# Patient Record
Sex: MALE | Race: WHITE | Employment: FULL TIME
[De-identification: names, ages, dates, MRNs, and addresses within clinical notes are randomized per-mention and may not be internally consistent; named-entity substitution may affect disease eponyms.]

---

## 2022-03-18 PROBLEM — E66.01 OBESITY, MORBID (HCC): Status: ACTIVE | Noted: 2018-01-09

## 2022-03-19 PROBLEM — K21.9 GASTROESOPHAGEAL REFLUX DISEASE: Status: ACTIVE | Noted: 2018-07-12

## 2022-08-08 ENCOUNTER — PATIENT MESSAGE (OUTPATIENT)
Dept: INTERNAL MEDICINE CLINIC | Facility: CLINIC | Age: 62
End: 2022-08-08

## 2022-08-09 NOTE — TELEPHONE ENCOUNTER
From: Segun Glass. To: Dr. Alejandro Andres: 8/8/2022 2:01 PM EDT  Subject: Tingling in Right Arm    The past couple of weeks, I have been experiencing periodic tingling sensations in my right arm. It seems to only last a few seconds, then goes away. It does this about 10-12 times per day. ..sometimes more. And the past several nights, I have started experiencing pain shooting from my upper bicep down to about my elbow in my right arm (about 6 on a scale of 1-10). However, the pain is strong enough to wake me. It sometimes lasts for up to an hour before it seems to go away. I don't know if I have a pinched nerve or something else. I just wonder if this is something I should be concerned about.

## 2023-01-26 DIAGNOSIS — I10 ESSENTIAL (PRIMARY) HYPERTENSION: ICD-10-CM

## 2023-01-26 RX ORDER — NEBIVOLOL 10 MG/1
TABLET ORAL
Qty: 90 TABLET | Refills: 0 | Status: SHIPPED | OUTPATIENT
Start: 2023-01-26

## 2023-02-02 DIAGNOSIS — I10 ESSENTIAL (PRIMARY) HYPERTENSION: ICD-10-CM

## 2023-02-02 RX ORDER — AMLODIPINE BESYLATE 10 MG/1
TABLET ORAL
Qty: 90 TABLET | Refills: 0 | Status: SHIPPED | OUTPATIENT
Start: 2023-02-02 | End: 2023-02-24 | Stop reason: SDUPTHER

## 2023-02-22 SDOH — ECONOMIC STABILITY: INCOME INSECURITY: HOW HARD IS IT FOR YOU TO PAY FOR THE VERY BASICS LIKE FOOD, HOUSING, MEDICAL CARE, AND HEATING?: NOT HARD AT ALL

## 2023-02-22 SDOH — ECONOMIC STABILITY: TRANSPORTATION INSECURITY
IN THE PAST 12 MONTHS, HAS LACK OF TRANSPORTATION KEPT YOU FROM MEETINGS, WORK, OR FROM GETTING THINGS NEEDED FOR DAILY LIVING?: NO

## 2023-02-22 SDOH — ECONOMIC STABILITY: FOOD INSECURITY: WITHIN THE PAST 12 MONTHS, YOU WORRIED THAT YOUR FOOD WOULD RUN OUT BEFORE YOU GOT MONEY TO BUY MORE.: NEVER TRUE

## 2023-02-22 SDOH — ECONOMIC STABILITY: HOUSING INSECURITY
IN THE LAST 12 MONTHS, WAS THERE A TIME WHEN YOU DID NOT HAVE A STEADY PLACE TO SLEEP OR SLEPT IN A SHELTER (INCLUDING NOW)?: NO

## 2023-02-22 SDOH — ECONOMIC STABILITY: FOOD INSECURITY: WITHIN THE PAST 12 MONTHS, THE FOOD YOU BOUGHT JUST DIDN'T LAST AND YOU DIDN'T HAVE MONEY TO GET MORE.: NEVER TRUE

## 2023-02-24 ENCOUNTER — OFFICE VISIT (OUTPATIENT)
Dept: INTERNAL MEDICINE CLINIC | Facility: CLINIC | Age: 63
End: 2023-02-24
Payer: COMMERCIAL

## 2023-02-24 VITALS
HEART RATE: 50 BPM | DIASTOLIC BLOOD PRESSURE: 70 MMHG | RESPIRATION RATE: 17 BRPM | WEIGHT: 253 LBS | BODY MASS INDEX: 39.71 KG/M2 | OXYGEN SATURATION: 98 % | SYSTOLIC BLOOD PRESSURE: 130 MMHG | HEIGHT: 67 IN

## 2023-02-24 DIAGNOSIS — Z12.5 SPECIAL SCREENING FOR MALIGNANT NEOPLASM OF PROSTATE: ICD-10-CM

## 2023-02-24 DIAGNOSIS — I10 ESSENTIAL (PRIMARY) HYPERTENSION: Primary | ICD-10-CM

## 2023-02-24 DIAGNOSIS — E78.2 MIXED HYPERLIPIDEMIA: ICD-10-CM

## 2023-02-24 DIAGNOSIS — K21.9 GASTROESOPHAGEAL REFLUX DISEASE, UNSPECIFIED WHETHER ESOPHAGITIS PRESENT: ICD-10-CM

## 2023-02-24 LAB
ALBUMIN SERPL-MCNC: 3.7 G/DL (ref 3.2–4.6)
ALBUMIN/GLOB SERPL: 1 (ref 0.4–1.6)
ALP SERPL-CCNC: 72 U/L (ref 50–136)
ALT SERPL-CCNC: 50 U/L (ref 12–65)
ANION GAP SERPL CALC-SCNC: 3 MMOL/L (ref 2–11)
AST SERPL-CCNC: 18 U/L (ref 15–37)
BILIRUB DIRECT SERPL-MCNC: 0.2 MG/DL
BILIRUB SERPL-MCNC: 0.5 MG/DL (ref 0.2–1.1)
BUN SERPL-MCNC: 16 MG/DL (ref 8–23)
CALCIUM SERPL-MCNC: 9.7 MG/DL (ref 8.3–10.4)
CHLORIDE SERPL-SCNC: 106 MMOL/L (ref 101–110)
CHOLEST SERPL-MCNC: 172 MG/DL
CO2 SERPL-SCNC: 28 MMOL/L (ref 21–32)
CREAT SERPL-MCNC: 1 MG/DL (ref 0.8–1.5)
ERYTHROCYTE [DISTWIDTH] IN BLOOD BY AUTOMATED COUNT: 12.5 % (ref 11.9–14.6)
GLOBULIN SER CALC-MCNC: 3.7 G/DL (ref 2.8–4.5)
GLUCOSE SERPL-MCNC: 111 MG/DL (ref 65–100)
HCT VFR BLD AUTO: 47.3 % (ref 41.1–50.3)
HDLC SERPL-MCNC: 52 MG/DL (ref 40–60)
HDLC SERPL: 3.3
HGB BLD-MCNC: 15.7 G/DL (ref 13.6–17.2)
LDLC SERPL CALC-MCNC: 93.4 MG/DL
MCH RBC QN AUTO: 30.4 PG (ref 26.1–32.9)
MCHC RBC AUTO-ENTMCNC: 33.2 G/DL (ref 31.4–35)
MCV RBC AUTO: 91.7 FL (ref 82–102)
NRBC # BLD: 0 K/UL (ref 0–0.2)
PLATELET # BLD AUTO: 204 K/UL (ref 150–450)
PMV BLD AUTO: 11.4 FL (ref 9.4–12.3)
POTASSIUM SERPL-SCNC: 4.6 MMOL/L (ref 3.5–5.1)
PROT SERPL-MCNC: 7.4 G/DL (ref 6.3–8.2)
PSA SERPL-MCNC: 0.6 NG/ML
RBC # BLD AUTO: 5.16 M/UL (ref 4.23–5.6)
SODIUM SERPL-SCNC: 137 MMOL/L (ref 133–143)
TRIGL SERPL-MCNC: 133 MG/DL (ref 35–150)
VLDLC SERPL CALC-MCNC: 26.6 MG/DL (ref 6–23)
WBC # BLD AUTO: 6.1 K/UL (ref 4.3–11.1)

## 2023-02-24 PROCEDURE — 3078F DIAST BP <80 MM HG: CPT | Performed by: FAMILY MEDICINE

## 2023-02-24 PROCEDURE — 3075F SYST BP GE 130 - 139MM HG: CPT | Performed by: FAMILY MEDICINE

## 2023-02-24 PROCEDURE — 99214 OFFICE O/P EST MOD 30 MIN: CPT | Performed by: FAMILY MEDICINE

## 2023-02-24 RX ORDER — NEBIVOLOL 10 MG/1
TABLET ORAL
Qty: 90 TABLET | Refills: 1 | Status: SHIPPED | OUTPATIENT
Start: 2023-02-24

## 2023-02-24 RX ORDER — ATORVASTATIN CALCIUM 40 MG/1
TABLET, FILM COATED ORAL
Qty: 90 TABLET | Refills: 1 | Status: SHIPPED | OUTPATIENT
Start: 2023-02-24

## 2023-02-24 RX ORDER — OMEPRAZOLE 40 MG/1
CAPSULE, DELAYED RELEASE ORAL
Qty: 90 CAPSULE | Refills: 1 | Status: SHIPPED | OUTPATIENT
Start: 2023-02-24

## 2023-02-24 RX ORDER — LISINOPRIL 40 MG/1
TABLET ORAL
Qty: 90 TABLET | Refills: 1 | Status: SHIPPED | OUTPATIENT
Start: 2023-02-24

## 2023-02-24 RX ORDER — AMLODIPINE BESYLATE 10 MG/1
TABLET ORAL
Qty: 90 TABLET | Refills: 1 | Status: SHIPPED | OUTPATIENT
Start: 2023-02-24

## 2023-02-24 ASSESSMENT — PATIENT HEALTH QUESTIONNAIRE - PHQ9
1. LITTLE INTEREST OR PLEASURE IN DOING THINGS: 0
SUM OF ALL RESPONSES TO PHQ QUESTIONS 1-9: 0
2. FEELING DOWN, DEPRESSED OR HOPELESS: 0
SUM OF ALL RESPONSES TO PHQ QUESTIONS 1-9: 0
SUM OF ALL RESPONSES TO PHQ9 QUESTIONS 1 & 2: 0

## 2023-02-24 NOTE — PROGRESS NOTES
Mirian Suarez DO  Diplomate of the [email or web address] of [address] [place] [address] Internal Medicine      Will Ellis (: 1960) is a 66 y.o. male, here for evaluation of the following chief complaint(s):  Hypertension       ASSESSMENT/PLAN:  1. Essential (primary) hypertension  -     nebivolol (BYSTOLIC) 10 MG tablet; TAKE 1 TABLET BY MOUTH EVERY DAY, Disp-90 tablet, R-1Normal  -     amLODIPine (NORVASC) 10 MG tablet; TAKE 1 TABLET BY MOUTH EVERY DAY, Disp-90 tablet, R-1Normal  -     lisinopril (PRINIVIL;ZESTRIL) 40 MG tablet; TAKE 1 TABLET BY MOUTH EVERY DAY, Disp-90 tablet, R-1Normal  -     Basic Metabolic Panel; Future  -     CBC; Future  2. Gastroesophageal reflux disease, unspecified whether esophagitis present  -     omeprazole (PRILOSEC) 40 MG delayed release capsule; TAKE 1 CAPSULE BY MOUTH EVERY DAY, Disp-90 capsule, R-1Normal  3. Mixed hyperlipidemia  -     atorvastatin (LIPITOR) 40 MG tablet; TAKE 1 TABLET BY MOUTH EVERY DAY, Disp-90 tablet, R-1Normal  -     Hepatic Function Panel; Future  -     Lipid Panel; Future  4. Special screening for malignant neoplasm of prostate  -     PSA Screening; Future     Tolerating medication well for HTN. Achieving desired therapeutic response with   Key Anti-Hypertensive Meds            nebivolol (BYSTOLIC) 10 MG tablet (Taking)    Sig: TAKE 1 TABLET BY MOUTH EVERY DAY    amLODIPine (NORVASC) 10 MG tablet (Taking)    Sig: TAKE 1 TABLET BY MOUTH EVERY DAY    lisinopril (PRINIVIL;ZESTRIL) 40 MG tablet (Taking)    Sig: TAKE 1 TABLET BY MOUTH EVERY DAY         --will continue. Will periodically review and adjust if needed. Encourage home monitoring. We will continue with omeprazole. Again he did try to wean off but was unsuccessful. He is getting good clinical benefit from this and tolerating it well. Recheck lipid levels. Continue with atorvastatin as providing good clinical benefit.   Pros and cons of PSA testing for prostate screening were discussed. The patient does wish to proceed. SUBJECTIVE/OBJECTIVE:  HPI:  HTN: Home BP Monitoring: yes. taking medications as instructed, no medication side effects noted. He denies chest pain, palpitations, exertional pain or pressure. GERD has been doing well. He really has not had any significant breakthrough symptoms. Tolerating medication well. He did try to wean off with Pepcid, but was unsuccessful with return of symptoms. ROS negative except as noted above today. Social History     Tobacco Use    Smoking status: Former     Types: Cigarettes     Quit date: 1998     Years since quittin.2    Smokeless tobacco: Never   Substance Use Topics    Alcohol use: Yes    Drug use: No     Vitals:    23 0803   BP: 130/70   Site: Left Upper Arm   Position: Sitting   Pulse: 50   Resp: 17   SpO2: 98%   Weight: 253 lb (114.8 kg)   Height: 5' 7\" (1.702 m)      Body mass index is 39.63 kg/m². Physical Exam  Vitals reviewed. Constitutional:       General: He is not in acute distress. Appearance: He is not ill-appearing. HENT:      Head: Normocephalic. Eyes:      Extraocular Movements: Extraocular movements intact. Cardiovascular:      Rate and Rhythm: Normal rate and regular rhythm. Heart sounds: Normal heart sounds. Pulmonary:      Effort: Pulmonary effort is normal.      Breath sounds: Normal breath sounds. Abdominal:      General: Abdomen is flat. Palpations: Abdomen is soft. Tenderness: There is no abdominal tenderness. Musculoskeletal:         General: Normal range of motion. Cervical back: Neck supple. Skin:     General: Skin is warm and dry. Neurological:      General: No focal deficit present. Mental Status: He is alert. An electronic signature was used to authenticate this note.   Alexia Cotto DO

## 2023-07-29 DIAGNOSIS — E78.2 MIXED HYPERLIPIDEMIA: ICD-10-CM

## 2023-07-31 RX ORDER — ATORVASTATIN CALCIUM 40 MG/1
TABLET, FILM COATED ORAL
Qty: 90 TABLET | Refills: 1 | Status: SHIPPED | OUTPATIENT
Start: 2023-07-31

## 2023-09-06 DIAGNOSIS — I10 ESSENTIAL (PRIMARY) HYPERTENSION: ICD-10-CM

## 2023-09-07 RX ORDER — LISINOPRIL 40 MG/1
TABLET ORAL
Qty: 90 TABLET | Refills: 1 | Status: SHIPPED | OUTPATIENT
Start: 2023-09-07

## 2023-09-17 DIAGNOSIS — E78.2 MIXED HYPERLIPIDEMIA: ICD-10-CM

## 2023-09-18 RX ORDER — ATORVASTATIN CALCIUM 40 MG/1
40 TABLET, FILM COATED ORAL DAILY
Qty: 90 TABLET | Refills: 1 | Status: SHIPPED | OUTPATIENT
Start: 2023-09-18

## 2023-10-01 DIAGNOSIS — I10 ESSENTIAL (PRIMARY) HYPERTENSION: ICD-10-CM

## 2023-10-02 RX ORDER — AMLODIPINE BESYLATE 10 MG/1
TABLET ORAL
Qty: 90 TABLET | Refills: 1 | Status: SHIPPED | OUTPATIENT
Start: 2023-10-02

## 2023-10-11 DIAGNOSIS — I10 ESSENTIAL (PRIMARY) HYPERTENSION: ICD-10-CM

## 2023-10-11 RX ORDER — NEBIVOLOL 10 MG/1
TABLET ORAL
Qty: 90 TABLET | Refills: 1 | Status: SHIPPED | OUTPATIENT
Start: 2023-10-11

## 2023-12-03 DIAGNOSIS — K21.9 GASTROESOPHAGEAL REFLUX DISEASE, UNSPECIFIED WHETHER ESOPHAGITIS PRESENT: ICD-10-CM

## 2023-12-04 RX ORDER — OMEPRAZOLE 40 MG/1
CAPSULE, DELAYED RELEASE ORAL
Qty: 90 CAPSULE | Refills: 1 | Status: SHIPPED | OUTPATIENT
Start: 2023-12-04

## 2024-02-20 RX ORDER — NEBIVOLOL 10 MG/1
10 TABLET ORAL DAILY
Qty: 90 TABLET | Refills: 1 | Status: CANCELLED | OUTPATIENT
Start: 2024-02-20

## 2024-02-21 DIAGNOSIS — I10 ESSENTIAL (PRIMARY) HYPERTENSION: ICD-10-CM

## 2024-02-22 DIAGNOSIS — I10 ESSENTIAL (PRIMARY) HYPERTENSION: ICD-10-CM

## 2024-02-23 RX ORDER — LISINOPRIL 40 MG/1
TABLET ORAL
Qty: 90 TABLET | Refills: 1 | OUTPATIENT
Start: 2024-02-23

## 2024-02-23 RX ORDER — LISINOPRIL 40 MG/1
40 TABLET ORAL DAILY
Qty: 90 TABLET | Refills: 1 | Status: SHIPPED | OUTPATIENT
Start: 2024-02-23

## 2024-02-23 ASSESSMENT — PATIENT HEALTH QUESTIONNAIRE - PHQ9
SUM OF ALL RESPONSES TO PHQ9 QUESTIONS 1 & 2: 0
SUM OF ALL RESPONSES TO PHQ QUESTIONS 1-9: 0
2. FEELING DOWN, DEPRESSED OR HOPELESS: 0
SUM OF ALL RESPONSES TO PHQ9 QUESTIONS 1 & 2: 0
SUM OF ALL RESPONSES TO PHQ QUESTIONS 1-9: 0
2. FEELING DOWN, DEPRESSED OR HOPELESS: NOT AT ALL
1. LITTLE INTEREST OR PLEASURE IN DOING THINGS: NOT AT ALL

## 2024-02-26 ENCOUNTER — OFFICE VISIT (OUTPATIENT)
Dept: INTERNAL MEDICINE CLINIC | Facility: CLINIC | Age: 64
End: 2024-02-26
Payer: COMMERCIAL

## 2024-02-26 VITALS
HEIGHT: 67 IN | BODY MASS INDEX: 41.59 KG/M2 | OXYGEN SATURATION: 96 % | HEART RATE: 70 BPM | WEIGHT: 265 LBS | SYSTOLIC BLOOD PRESSURE: 128 MMHG | DIASTOLIC BLOOD PRESSURE: 68 MMHG

## 2024-02-26 DIAGNOSIS — I48.91 ATRIAL FIBRILLATION WITH RAPID VENTRICULAR RESPONSE (HCC): Primary | ICD-10-CM

## 2024-02-26 DIAGNOSIS — E66.01 OBESITY, CLASS III, BMI 40-49.9 (MORBID OBESITY) (HCC): ICD-10-CM

## 2024-02-26 DIAGNOSIS — I10 ESSENTIAL (PRIMARY) HYPERTENSION: ICD-10-CM

## 2024-02-26 DIAGNOSIS — I49.9 IRREGULAR HEART BEAT: ICD-10-CM

## 2024-02-26 DIAGNOSIS — K21.9 GASTROESOPHAGEAL REFLUX DISEASE, UNSPECIFIED WHETHER ESOPHAGITIS PRESENT: ICD-10-CM

## 2024-02-26 DIAGNOSIS — E78.2 MIXED HYPERLIPIDEMIA: ICD-10-CM

## 2024-02-26 DIAGNOSIS — Z12.5 SPECIAL SCREENING FOR MALIGNANT NEOPLASM OF PROSTATE: ICD-10-CM

## 2024-02-26 LAB
ALBUMIN SERPL-MCNC: 3.9 G/DL (ref 3.2–4.6)
ALBUMIN/GLOB SERPL: 1.1 (ref 0.4–1.6)
ALP SERPL-CCNC: 77 U/L (ref 50–136)
ALT SERPL-CCNC: 42 U/L (ref 12–65)
ANION GAP SERPL CALC-SCNC: 7 MMOL/L (ref 2–11)
AST SERPL-CCNC: 22 U/L (ref 15–37)
BILIRUB DIRECT SERPL-MCNC: 0.2 MG/DL
BILIRUB SERPL-MCNC: 0.6 MG/DL (ref 0.2–1.1)
BUN SERPL-MCNC: 19 MG/DL (ref 8–23)
CALCIUM SERPL-MCNC: 9.7 MG/DL (ref 8.3–10.4)
CHLORIDE SERPL-SCNC: 110 MMOL/L (ref 103–113)
CHOLEST SERPL-MCNC: 178 MG/DL
CO2 SERPL-SCNC: 26 MMOL/L (ref 21–32)
CREAT SERPL-MCNC: 1.2 MG/DL (ref 0.8–1.5)
ERYTHROCYTE [DISTWIDTH] IN BLOOD BY AUTOMATED COUNT: 12.8 % (ref 11.9–14.6)
GLOBULIN SER CALC-MCNC: 3.6 G/DL (ref 2.8–4.5)
GLUCOSE SERPL-MCNC: 138 MG/DL (ref 65–100)
HCT VFR BLD AUTO: 50.9 % (ref 41.1–50.3)
HDLC SERPL-MCNC: 48 MG/DL (ref 40–60)
HDLC SERPL: 3.7
HGB BLD-MCNC: 16.8 G/DL (ref 13.6–17.2)
LDLC SERPL CALC-MCNC: 105.4 MG/DL
MCH RBC QN AUTO: 29.9 PG (ref 26.1–32.9)
MCHC RBC AUTO-ENTMCNC: 33 G/DL (ref 31.4–35)
MCV RBC AUTO: 90.7 FL (ref 82–102)
NRBC # BLD: 0 K/UL (ref 0–0.2)
PLATELET # BLD AUTO: 227 K/UL (ref 150–450)
PMV BLD AUTO: 12 FL (ref 9.4–12.3)
POTASSIUM SERPL-SCNC: 4.5 MMOL/L (ref 3.5–5.1)
PROT SERPL-MCNC: 7.5 G/DL (ref 6.3–8.2)
PSA SERPL-MCNC: 0.5 NG/ML
RBC # BLD AUTO: 5.61 M/UL (ref 4.23–5.6)
SODIUM SERPL-SCNC: 143 MMOL/L (ref 136–146)
TRIGL SERPL-MCNC: 123 MG/DL (ref 35–150)
TSH W FREE THYROID IF ABNORMAL: 1.34 UIU/ML (ref 0.36–3.74)
VLDLC SERPL CALC-MCNC: 24.6 MG/DL (ref 6–23)
WBC # BLD AUTO: 8.7 K/UL (ref 4.3–11.1)

## 2024-02-26 PROCEDURE — 99214 OFFICE O/P EST MOD 30 MIN: CPT | Performed by: FAMILY MEDICINE

## 2024-02-26 PROCEDURE — 3074F SYST BP LT 130 MM HG: CPT | Performed by: FAMILY MEDICINE

## 2024-02-26 PROCEDURE — 93000 ELECTROCARDIOGRAM COMPLETE: CPT | Performed by: FAMILY MEDICINE

## 2024-02-26 PROCEDURE — 3078F DIAST BP <80 MM HG: CPT | Performed by: FAMILY MEDICINE

## 2024-02-26 RX ORDER — ATORVASTATIN CALCIUM 40 MG/1
40 TABLET, FILM COATED ORAL DAILY
Qty: 90 TABLET | Refills: 1 | Status: SHIPPED | OUTPATIENT
Start: 2024-02-26

## 2024-02-26 RX ORDER — AMLODIPINE BESYLATE 10 MG/1
TABLET ORAL
Qty: 90 TABLET | Refills: 1 | Status: SHIPPED | OUTPATIENT
Start: 2024-02-26

## 2024-02-26 RX ORDER — LISINOPRIL 40 MG/1
40 TABLET ORAL DAILY
Qty: 90 TABLET | Refills: 1 | Status: SHIPPED | OUTPATIENT
Start: 2024-02-26 | End: 2024-02-29

## 2024-02-26 RX ORDER — METOPROLOL SUCCINATE 50 MG/1
50 TABLET, EXTENDED RELEASE ORAL DAILY
Qty: 30 TABLET | Refills: 5 | Status: SHIPPED | OUTPATIENT
Start: 2024-02-26

## 2024-02-26 NOTE — PROGRESS NOTES
Rebel Joseph DO  Diplomate of the American Board of Family Medicine  [facility]      Jose Sandoval Jr. (: 1960) is a 64 y.o. male, here for evaluation of the following chief complaint(s):  Hypertension and Medication Refill       ASSESSMENT/PLAN:  1. Atrial fibrillation with rapid ventricular response (HCC)  -     apixaban (ELIQUIS) 5 MG TABS tablet; Take 1 tablet by mouth 2 times daily, Disp-60 tablet, R-0Normal  -     metoprolol succinate (TOPROL XL) 50 MG extended release tablet; Take 1 tablet by mouth daily, Disp-30 tablet, R-5Normal  -     Basic Metabolic Panel; Future  -     CBC; Future  -     TSH with Reflex; Future  -     [facility] [place]  2. Essential (primary) hypertension  -     amLODIPine (NORVASC) 10 MG tablet; TAKE 1 TABLET BY MOUTH EVERY DAY, Disp-90 tablet, R-1Normal  -     lisinopril (PRINIVIL;ZESTRIL) 40 MG tablet; Take 1 tablet by mouth daily, Disp-90 tablet, R-1Normal  3. Mixed hyperlipidemia  -     atorvastatin (LIPITOR) 40 MG tablet; Take 1 tablet by mouth daily, Disp-90 tablet, R-1Normal  -     Hepatic Function Panel; Future  -     Lipid Panel; Future  4. Gastroesophageal reflux disease, unspecified whether esophagitis present  5. Obesity, Class III, BMI 40-49.9 (morbid obesity) (HCC)  6. Irregular heart beat  -     EKG 12 Lead  7. Special screening for malignant neoplasm of prostate  -     PSA Screening; Future         Discussed with patient pathophysiology and treatment options with atrial fibrillation.  His [id number] 2 score is 1 which is low to moderate.  We did discuss antiplatelet therapy versus anticoagulation.  Have decided to go ahead and start on Eliquis twice daily.  Discussed bleeding precautions.  Will go ahead and switch from nebivolol over to metoprolol 50 mg and titrate for rate control.  Patient is completely asymptomatic.  Declines ER transfer.  Will place referral for cardiology.  Advised for any symptoms such as

## 2024-02-27 NOTE — RESULT ENCOUNTER NOTE
Pls let know blood glucose up a little, most likely due to candy he had eaten, but please add on A1c. Otherwise, looks stable.

## 2024-02-28 LAB
EST. AVERAGE GLUCOSE BLD GHB EST-MCNC: 120 MG/DL
HBA1C MFR BLD: 5.8 % (ref 4.8–5.6)

## 2024-02-29 ENCOUNTER — INITIAL CONSULT (OUTPATIENT)
Age: 64
End: 2024-02-29
Payer: COMMERCIAL

## 2024-02-29 VITALS
DIASTOLIC BLOOD PRESSURE: 70 MMHG | WEIGHT: 265 LBS | HEIGHT: 67 IN | SYSTOLIC BLOOD PRESSURE: 132 MMHG | BODY MASS INDEX: 41.59 KG/M2 | HEART RATE: 60 BPM

## 2024-02-29 DIAGNOSIS — E78.2 MIXED HYPERLIPIDEMIA: Chronic | ICD-10-CM

## 2024-02-29 DIAGNOSIS — I48.0 PAROXYSMAL ATRIAL FIBRILLATION (HCC): Primary | ICD-10-CM

## 2024-02-29 DIAGNOSIS — R06.83 SNORING: ICD-10-CM

## 2024-02-29 DIAGNOSIS — D68.69 SECONDARY HYPERCOAGULABLE STATE (HCC): ICD-10-CM

## 2024-02-29 DIAGNOSIS — I48.91 ATRIAL FIBRILLATION WITH RAPID VENTRICULAR RESPONSE (HCC): ICD-10-CM

## 2024-02-29 DIAGNOSIS — I10 PRIMARY HYPERTENSION: Chronic | ICD-10-CM

## 2024-02-29 PROCEDURE — 99244 OFF/OP CNSLTJ NEW/EST MOD 40: CPT | Performed by: INTERNAL MEDICINE

## 2024-02-29 PROCEDURE — 3075F SYST BP GE 130 - 139MM HG: CPT | Performed by: INTERNAL MEDICINE

## 2024-02-29 PROCEDURE — 93000 ELECTROCARDIOGRAM COMPLETE: CPT | Performed by: INTERNAL MEDICINE

## 2024-02-29 PROCEDURE — 3078F DIAST BP <80 MM HG: CPT | Performed by: INTERNAL MEDICINE

## 2024-02-29 ASSESSMENT — ENCOUNTER SYMPTOMS: SHORTNESS OF BREATH: 0

## 2024-02-29 NOTE — PROGRESS NOTES
[address] [facility], [address] [place] [address]  PHONE: [phone]    SUBJECTIVE:   Jose Sandoval Jr. is a 64 y.o. male 1960   seen for a consultation visit regarding the following:     Chief Complaint   Patient presents with    Consultation    Atrial Fibrillation              Consultation is requested for evaluation of Consultation and Atrial Fibrillation   .    History of present illness: 64 y.o. male with PMH HTN, HLD, GERD, obesity presenting to establish care for new onset afib. Patient recently saw his PCP and was found with afib. He was started on metoprolol and apixaban then referred here. He was completely asymptomatic of his afib and denies chest pain, dyspnea, palpitations, light-headedness or any other symptoms. Per wife, patient frequently snores and will sometimes seem to briefly stop breathing. He has never been tested for MARA.      Past Medical History, Past Surgical History, Family history, Social History, and Medications were all reviewed with the patient today and updated as necessary.       No Known Allergies  Past Medical History:   Diagnosis Date    Benign essential HTN 3/5/2015    Calculus of kidney     Gastroesophageal reflux disease 2018    Impaired fasting glucose 3/5/2015    Mixed hyperlipidemia 3/5/2015     Past Surgical History:   Procedure Laterality Date    ORTHOPEDIC SURGERY      Ankle Repair     Family History   Problem Relation Age of Onset    Diabetes Father         DM Type 2    Heart Attack Father     Coronary Art Dis Other     Heart Attack Mother     Stroke Father      Social History     Tobacco Use    Smoking status: Former     Current packs/day: 0.00     Types: Cigarettes     Quit date: 1998     Years since quittin.2    Smokeless tobacco: Never   Substance Use Topics    Alcohol use: Yes       ROS:    Review of Systems   Cardiovascular:  Negative for chest pain, dyspnea on exertion, palpitations and syncope.   Respiratory:  Negative for

## 2024-03-04 ENCOUNTER — APPOINTMENT (OUTPATIENT)
Dept: GENERAL RADIOLOGY | Age: 64
End: 2024-03-04
Payer: COMMERCIAL

## 2024-03-04 ENCOUNTER — HOSPITAL ENCOUNTER (EMERGENCY)
Age: 64
Discharge: HOME | End: 2024-03-04
Attending: EMERGENCY MEDICINE
Payer: COMMERCIAL

## 2024-03-04 VITALS
TEMPERATURE: 98.1 F | RESPIRATION RATE: 15 BRPM | OXYGEN SATURATION: 97 % | WEIGHT: 260 LBS | DIASTOLIC BLOOD PRESSURE: 93 MMHG | HEART RATE: 113 BPM | BODY MASS INDEX: 40.81 KG/M2 | HEIGHT: 67 IN | SYSTOLIC BLOOD PRESSURE: 145 MMHG

## 2024-03-04 DIAGNOSIS — I48.91 ATRIAL FIBRILLATION WITH RAPID VENTRICULAR RESPONSE (HCC): ICD-10-CM

## 2024-03-04 DIAGNOSIS — I48.0 PAROXYSMAL A-FIB (HCC): Primary | ICD-10-CM

## 2024-03-04 LAB
ANION GAP SERPL CALC-SCNC: 7 MMOL/L (ref 2–11)
BASOPHILS # BLD: 0.1 K/UL (ref 0–0.2)
BASOPHILS NFR BLD: 1 % (ref 0–2)
BUN SERPL-MCNC: 14 MG/DL (ref 8–23)
CALCIUM SERPL-MCNC: 9.6 MG/DL (ref 8.3–10.4)
CHLORIDE SERPL-SCNC: 107 MMOL/L (ref 103–113)
CO2 SERPL-SCNC: 26 MMOL/L (ref 21–32)
CREAT SERPL-MCNC: 1.08 MG/DL (ref 0.8–1.5)
DIFFERENTIAL METHOD BLD: NORMAL
EOSINOPHIL # BLD: 0.1 K/UL (ref 0–0.8)
EOSINOPHIL NFR BLD: 1 % (ref 0.5–7.8)
ERYTHROCYTE [DISTWIDTH] IN BLOOD BY AUTOMATED COUNT: 12.5 % (ref 11.9–14.6)
GLUCOSE SERPL-MCNC: 119 MG/DL (ref 65–100)
HCT VFR BLD AUTO: 48.6 % (ref 41.1–50.3)
HGB BLD-MCNC: 16.5 G/DL (ref 13.6–17.2)
IMM GRANULOCYTES # BLD AUTO: 0 K/UL (ref 0–0.5)
IMM GRANULOCYTES NFR BLD AUTO: 0 % (ref 0–5)
LYMPHOCYTES # BLD: 1.4 K/UL (ref 0.5–4.6)
LYMPHOCYTES NFR BLD: 17 % (ref 13–44)
MAGNESIUM SERPL-MCNC: 1.8 MG/DL (ref 1.8–2.4)
MCH RBC QN AUTO: 29.5 PG (ref 26.1–32.9)
MCHC RBC AUTO-ENTMCNC: 34 G/DL (ref 31.4–35)
MCV RBC AUTO: 86.8 FL (ref 82–102)
MONOCYTES # BLD: 0.8 K/UL (ref 0.1–1.3)
MONOCYTES NFR BLD: 9 % (ref 4–12)
NEUTS SEG # BLD: 5.9 K/UL (ref 1.7–8.2)
NEUTS SEG NFR BLD: 71 % (ref 43–78)
NRBC # BLD: 0 K/UL (ref 0–0.2)
PLATELET # BLD AUTO: 236 K/UL (ref 150–450)
PMV BLD AUTO: 10.7 FL (ref 9.4–12.3)
POTASSIUM SERPL-SCNC: 3.8 MMOL/L (ref 3.5–5.1)
RBC # BLD AUTO: 5.6 M/UL (ref 4.23–5.6)
SODIUM SERPL-SCNC: 140 MMOL/L (ref 136–146)
TROPONIN I SERPL HS-MCNC: 5.3 PG/ML (ref 0–14)
WBC # BLD AUTO: 8.2 K/UL (ref 4.3–11.1)

## 2024-03-04 PROCEDURE — 6370000000 HC RX 637 (ALT 250 FOR IP): Performed by: EMERGENCY MEDICINE

## 2024-03-04 PROCEDURE — 71046 X-RAY EXAM CHEST 2 VIEWS: CPT

## 2024-03-04 PROCEDURE — 84484 ASSAY OF TROPONIN QUANT: CPT

## 2024-03-04 PROCEDURE — 99285 EMERGENCY DEPT VISIT HI MDM: CPT

## 2024-03-04 PROCEDURE — 85025 COMPLETE CBC W/AUTO DIFF WBC: CPT

## 2024-03-04 PROCEDURE — 80048 BASIC METABOLIC PNL TOTAL CA: CPT

## 2024-03-04 PROCEDURE — 93005 ELECTROCARDIOGRAM TRACING: CPT | Performed by: EMERGENCY MEDICINE

## 2024-03-04 PROCEDURE — 96374 THER/PROPH/DIAG INJ IV PUSH: CPT

## 2024-03-04 PROCEDURE — 2500000003 HC RX 250 WO HCPCS: Performed by: EMERGENCY MEDICINE

## 2024-03-04 PROCEDURE — 83735 ASSAY OF MAGNESIUM: CPT

## 2024-03-04 RX ORDER — DILTIAZEM HYDROCHLORIDE 5 MG/ML
10 INJECTION INTRAVENOUS
Status: COMPLETED | OUTPATIENT
Start: 2024-03-04 | End: 2024-03-04

## 2024-03-04 RX ORDER — METOPROLOL SUCCINATE 50 MG/1
100 TABLET, EXTENDED RELEASE ORAL DAILY
Qty: 30 TABLET | Refills: 5 | Status: SHIPPED
Start: 2024-03-04 | End: 2024-03-05 | Stop reason: SDUPTHER

## 2024-03-04 RX ORDER — METOPROLOL SUCCINATE 100 MG/1
50 TABLET, EXTENDED RELEASE ORAL
Status: COMPLETED | OUTPATIENT
Start: 2024-03-04 | End: 2024-03-04

## 2024-03-04 RX ADMIN — DILTIAZEM HYDROCHLORIDE 10 MG: 5 INJECTION, SOLUTION INTRAVENOUS at 14:30

## 2024-03-04 RX ADMIN — METOPROLOL SUCCINATE 50 MG: 100 TABLET, EXTENDED RELEASE ORAL at 15:05

## 2024-03-04 ASSESSMENT — ENCOUNTER SYMPTOMS
COUGH: 0
WHEEZING: 0
SHORTNESS OF BREATH: 0
CHEST TIGHTNESS: 0
STRIDOR: 0
CHOKING: 0

## 2024-03-04 ASSESSMENT — PAIN - FUNCTIONAL ASSESSMENT: PAIN_FUNCTIONAL_ASSESSMENT: 0-10

## 2024-03-04 ASSESSMENT — PAIN SCALES - GENERAL: PAINLEVEL_OUTOF10: 0

## 2024-03-04 NOTE — ED TRIAGE NOTES
Pt ambulatory with c/o heart palpitations and reports he was diagnosed with afib last Monday and started a blood thinner the same day. Pt reports he is wearing a Holter monitor since Thursday.

## 2024-03-04 NOTE — ED PROVIDER NOTES
well.    The history is provided by the patient and medical records.       ROS     Review of Systems   Constitutional:  Negative for chills and fever.   Respiratory:  Negative for cough, choking, chest tightness, shortness of breath, wheezing and stridor.    Cardiovascular:  Negative for chest pain, palpitations and leg swelling.   All other systems reviewed and are negative.       Physical Exam     Vitals signs and nursing note reviewed:  Vitals:    03/04/24 1427 03/04/24 1432 03/04/24 1438 03/04/24 1440   BP:    (!) 149/92   Pulse: (!) 128 (!) 148 (!) 123 (!) 106   Resp: 17 22 20 18   Temp:       TempSrc:       SpO2: 97% 96% 93% 95%   Weight:       Height:          Physical Exam  Vitals and nursing note reviewed.   Constitutional:       General: He is not in acute distress.     Appearance: Normal appearance. He is not ill-appearing or toxic-appearing.   HENT:      Head: Normocephalic and atraumatic.      Mouth/Throat:      Mouth: Mucous membranes are moist.      Pharynx: Oropharynx is clear. No oropharyngeal exudate or posterior oropharyngeal erythema.   Eyes:      Extraocular Movements: Extraocular movements intact.      Conjunctiva/sclera: Conjunctivae normal.      Pupils: Pupils are equal, round, and reactive to light.   Cardiovascular:      Rate and Rhythm: Tachycardia present. Rhythm irregular.      Pulses: Normal pulses.      Heart sounds: Normal heart sounds.   Pulmonary:      Effort: Pulmonary effort is normal.      Breath sounds: Normal breath sounds.   Abdominal:      General: There is no distension.      Palpations: Abdomen is soft.      Tenderness: There is no abdominal tenderness. There is no right CVA tenderness, left CVA tenderness or guarding.   Musculoskeletal:         General: Normal range of motion.      Cervical back: Normal range of motion and neck supple.   Skin:     General: Skin is warm and dry.      Capillary Refill: Capillary refill takes less than 2 seconds.   Neurological:      General:  Opt out

## 2024-03-05 ENCOUNTER — PATIENT MESSAGE (OUTPATIENT)
Dept: INTERNAL MEDICINE CLINIC | Facility: CLINIC | Age: 64
End: 2024-03-05

## 2024-03-05 DIAGNOSIS — I48.91 ATRIAL FIBRILLATION WITH RAPID VENTRICULAR RESPONSE (HCC): ICD-10-CM

## 2024-03-05 LAB
EKG ATRIAL RATE: 394 BPM
EKG DIAGNOSIS: NORMAL
EKG Q-T INTERVAL: 296 MS
EKG QRS DURATION: 78 MS
EKG QTC CALCULATION (BAZETT): 469 MS
EKG R AXIS: 22 DEGREES
EKG T AXIS: -67 DEGREES
EKG VENTRICULAR RATE: 151 BPM

## 2024-03-05 PROCEDURE — 93010 ELECTROCARDIOGRAM REPORT: CPT | Performed by: INTERNAL MEDICINE

## 2024-03-07 RX ORDER — METOPROLOL SUCCINATE 50 MG/1
100 TABLET, EXTENDED RELEASE ORAL DAILY
Qty: 60 TABLET | Refills: 5 | Status: SHIPPED | OUTPATIENT
Start: 2024-03-07

## 2024-03-19 ENCOUNTER — OFFICE VISIT (OUTPATIENT)
Age: 64
End: 2024-03-19
Payer: COMMERCIAL

## 2024-03-19 VITALS
WEIGHT: 263 LBS | SYSTOLIC BLOOD PRESSURE: 138 MMHG | BODY MASS INDEX: 41.28 KG/M2 | DIASTOLIC BLOOD PRESSURE: 76 MMHG | HEART RATE: 72 BPM | HEIGHT: 67 IN

## 2024-03-19 DIAGNOSIS — I48.0 PAROXYSMAL ATRIAL FIBRILLATION (HCC): Primary | Chronic | ICD-10-CM

## 2024-03-19 DIAGNOSIS — I10 PRIMARY HYPERTENSION: Chronic | ICD-10-CM

## 2024-03-19 PROCEDURE — 99214 OFFICE O/P EST MOD 30 MIN: CPT | Performed by: INTERNAL MEDICINE

## 2024-03-19 PROCEDURE — 3078F DIAST BP <80 MM HG: CPT | Performed by: INTERNAL MEDICINE

## 2024-03-19 PROCEDURE — 3075F SYST BP GE 130 - 139MM HG: CPT | Performed by: INTERNAL MEDICINE

## 2024-03-19 RX ORDER — METOPROLOL SUCCINATE 100 MG/1
100 TABLET, EXTENDED RELEASE ORAL DAILY
Qty: 30 TABLET | Refills: 11 | Status: SHIPPED | OUTPATIENT
Start: 2024-03-19

## 2024-03-19 NOTE — PROGRESS NOTES
appearance.   HENT:      Head: Normocephalic and atraumatic.      Mouth/Throat:      Mouth: Mucous membranes are moist.   Eyes:      Extraocular Movements: Extraocular movements intact.   Neck:      Vascular: No carotid bruit.   Cardiovascular:      Rate and Rhythm: Normal rate. Rhythm irregular.      Pulses: Normal pulses.      Heart sounds: No murmur heard.     No friction rub. No gallop.   Pulmonary:      Effort: No respiratory distress.      Breath sounds: No wheezing, rhonchi or rales.   Abdominal:      General: There is no distension.   Musculoskeletal:         General: Swelling (1+ ankle edema) present. Normal range of motion.   Skin:     General: Skin is warm.      Coloration: Skin is not jaundiced.   Neurological:      Mental Status: He is alert and oriented to person, place, and time.   Psychiatric:         Mood and Affect: Mood and affect normal.         Medical problems and test results were reviewed with the patient today.     DATA REVIEW    LIPID PANEL     Lab Results   Component Value Date    CHOL 178 02/26/2024    CHOL 172 02/24/2023    CHOL 178 02/04/2022     Lab Results   Component Value Date    TRIG 123 02/26/2024    TRIG 133 02/24/2023    TRIG 119 02/04/2022     Lab Results   Component Value Date    HDL 48 02/26/2024    HDL 52 02/24/2023    HDL 57 02/04/2022     Lab Results   Component Value Date    LDLCALC 105.4 (H) 02/26/2024    LDLCALC 93.4 02/24/2023    LDLCALC 100 (H) 02/04/2022     Lab Results   Component Value Date    VLDL 21 02/04/2022    VLDL 16 02/01/2021     Lab Results   Component Value Date    CHOLHDLRATIO 3.7 02/26/2024    CHOLHDLRATIO 3.3 02/24/2023       BMP  Lab Results   Component Value Date/Time     03/04/2024 01:51 PM    K 3.8 03/04/2024 01:51 PM     03/04/2024 01:51 PM    CO2 26 03/04/2024 01:51 PM    BUN 14 03/04/2024 01:51 PM    CREATININE 1.08 03/04/2024 01:51 PM    GLUCOSE 119 03/04/2024 01:51 PM    CALCIUM 9.6 03/04/2024 01:51 PM

## 2024-03-21 ENCOUNTER — HOSPITAL ENCOUNTER (OUTPATIENT)
Dept: SLEEP CENTER | Age: 64
Discharge: HOME | End: 2024-03-24
Payer: COMMERCIAL

## 2024-03-21 PROCEDURE — 95806 SLEEP STUDY UNATT&RESP EFFT: CPT

## 2024-03-22 SDOH — ECONOMIC STABILITY: INCOME INSECURITY: HOW HARD IS IT FOR YOU TO PAY FOR THE VERY BASICS LIKE FOOD, HOUSING, MEDICAL CARE, AND HEATING?: NOT HARD AT ALL

## 2024-03-22 SDOH — ECONOMIC STABILITY: FOOD INSECURITY: WITHIN THE PAST 12 MONTHS, YOU WORRIED THAT YOUR FOOD WOULD RUN OUT BEFORE YOU GOT MONEY TO BUY MORE.: NEVER TRUE

## 2024-03-22 SDOH — ECONOMIC STABILITY: FOOD INSECURITY: WITHIN THE PAST 12 MONTHS, THE FOOD YOU BOUGHT JUST DIDN'T LAST AND YOU DIDN'T HAVE MONEY TO GET MORE.: NEVER TRUE

## 2024-03-22 ASSESSMENT — PATIENT HEALTH QUESTIONNAIRE - PHQ9
1. LITTLE INTEREST OR PLEASURE IN DOING THINGS: NOT AT ALL
1. LITTLE INTEREST OR PLEASURE IN DOING THINGS: NOT AT ALL
SUM OF ALL RESPONSES TO PHQ9 QUESTIONS 1 & 2: 0
SUM OF ALL RESPONSES TO PHQ QUESTIONS 1-9: 0
2. FEELING DOWN, DEPRESSED OR HOPELESS: NOT AT ALL
SUM OF ALL RESPONSES TO PHQ QUESTIONS 1-9: 0
SUM OF ALL RESPONSES TO PHQ QUESTIONS 1-9: 0
2. FEELING DOWN, DEPRESSED OR HOPELESS: NOT AT ALL
SUM OF ALL RESPONSES TO PHQ9 QUESTIONS 1 & 2: 0
SUM OF ALL RESPONSES TO PHQ QUESTIONS 1-9: 0

## 2024-03-25 ENCOUNTER — OFFICE VISIT (OUTPATIENT)
Dept: INTERNAL MEDICINE CLINIC | Facility: CLINIC | Age: 64
End: 2024-03-25
Payer: COMMERCIAL

## 2024-03-25 ENCOUNTER — CLINICAL DOCUMENTATION (OUTPATIENT)
Age: 64
End: 2024-03-25

## 2024-03-25 VITALS
WEIGHT: 262 LBS | HEIGHT: 67 IN | HEART RATE: 48 BPM | DIASTOLIC BLOOD PRESSURE: 72 MMHG | BODY MASS INDEX: 41.12 KG/M2 | OXYGEN SATURATION: 96 % | SYSTOLIC BLOOD PRESSURE: 140 MMHG

## 2024-03-25 DIAGNOSIS — I10 PRIMARY HYPERTENSION: ICD-10-CM

## 2024-03-25 DIAGNOSIS — R73.01 IFG (IMPAIRED FASTING GLUCOSE): ICD-10-CM

## 2024-03-25 DIAGNOSIS — I48.0 PAROXYSMAL ATRIAL FIBRILLATION (HCC): Primary | ICD-10-CM

## 2024-03-25 PROCEDURE — 3077F SYST BP >= 140 MM HG: CPT | Performed by: FAMILY MEDICINE

## 2024-03-25 PROCEDURE — 3078F DIAST BP <80 MM HG: CPT | Performed by: FAMILY MEDICINE

## 2024-03-25 PROCEDURE — 99214 OFFICE O/P EST MOD 30 MIN: CPT | Performed by: FAMILY MEDICINE

## 2024-03-25 NOTE — PROGRESS NOTES
Attempted PA via Cover My Meds. Patient not found. Called [employer]. Submitted PA via phone. Eliquis approved. Case ID [id number] 03/25/2024-03/25/2025.    Called pharmacy and informed of approval.

## 2024-03-25 NOTE — PROGRESS NOTES
Rebel Joseph DO  Diplomate of the American Board of Family Medicine  [facility]      Jose Sandoval Jr. (: 1960) is a 64 y.o. male, here for evaluation of the following chief complaint(s):  Hypertension       ASSESSMENT/PLAN:  1. Paroxysmal atrial fibrillation (HCC)  2. Primary hypertension  3. IFG (impaired fasting glucose)     Currently is in normal sinus rhythm.  Will continue with metoprolol at current dosing.  Continue with anticoagulation with Eliquis.  Echo okay.  Continue to follow-up with cardiology as scheduled.  Tolerating medication well for HTN. Achieving desired therapeutic response with   Hypertension Medications       ACE Inhibitors       lisinopril (PRINIVIL;ZESTRIL) 40 MG tablet Take 1 tablet by mouth daily       Calcium Channel Blockers       amLODIPine (NORVASC) 10 MG tablet TAKE 1 TABLET BY MOUTH EVERY DAY       Beta Blockers Cardio-Selective       metoprolol succinate (TOPROL XL) 100 MG extended release tablet Take 1 tablet by mouth daily         --will continue. Will periodically review and adjust if needed.  Blood pressure mildly elevated in office today, but running within normal limits at home.  Encourage home monitoring.   Recent A1c results reviewed.  Encouraged continued dietary adherence as well as exercise as able.          SUBJECTIVE/OBJECTIVE:  HPI:  Patient comes in today for follow-up on atrial fibrillation.  We discovered he was in atrial fibrillation at his last visit.  He has subsequently seen cardiology.  He is on anticoagulation with apixaban and metoprolol for rate control.  Has been doing well.  States he has had a couple of episodes where he has gone back into atrial fibrillation that he could feel.  Currently he feels like he is doing well.  No chest pain.  No palpitations.  No chest pressure.  Blood pressures remain well-controlled at home.  States this morning it was in the 120s systolic.  Tolerating medication well.  ROS negative

## 2024-04-01 DIAGNOSIS — G47.33 OSA (OBSTRUCTIVE SLEEP APNEA): Primary | ICD-10-CM

## 2024-04-24 ENCOUNTER — TELEPHONE (OUTPATIENT)
Dept: SLEEP MEDICINE | Age: 64
End: 2024-04-24

## 2024-04-24 NOTE — TELEPHONE ENCOUNTER
Peer to peer completed. The Titration study under code 47047 was approved with authorization # [id number]  this authorization expires on 7/18/2024. The study that was denied was for a baseline PSG (code 66765) which is not needed.    I let patient know and he does have the titration scheduled for 4/28/24.    Charlotte Beyer, EMMY - CNP

## 2024-04-24 NOTE — TELEPHONE ENCOUNTER
Called to appeal and peer to peer scheduled for 4/24 at 1315pm. Will update patient once peer to peer completed.    Charlotte Beyer, APRLUZ - CNP

## 2024-04-28 ENCOUNTER — HOSPITAL ENCOUNTER (OUTPATIENT)
Dept: SLEEP CENTER | Age: 64
Discharge: HOME | End: 2024-05-01
Payer: COMMERCIAL

## 2024-04-28 PROCEDURE — 95811 POLYSOM 6/>YRS CPAP 4/> PARM: CPT

## 2024-05-13 ENCOUNTER — OFFICE VISIT (OUTPATIENT)
Age: 64
End: 2024-05-13
Payer: COMMERCIAL

## 2024-05-13 VITALS
WEIGHT: 265.2 LBS | HEART RATE: 53 BPM | HEIGHT: 67 IN | SYSTOLIC BLOOD PRESSURE: 128 MMHG | DIASTOLIC BLOOD PRESSURE: 78 MMHG | BODY MASS INDEX: 41.62 KG/M2

## 2024-05-13 DIAGNOSIS — I10 PRIMARY HYPERTENSION: Chronic | ICD-10-CM

## 2024-05-13 DIAGNOSIS — I48.0 PAROXYSMAL ATRIAL FIBRILLATION (HCC): Primary | Chronic | ICD-10-CM

## 2024-05-13 PROCEDURE — 99214 OFFICE O/P EST MOD 30 MIN: CPT | Performed by: INTERNAL MEDICINE

## 2024-05-13 PROCEDURE — 3078F DIAST BP <80 MM HG: CPT | Performed by: INTERNAL MEDICINE

## 2024-05-13 PROCEDURE — 3074F SYST BP LT 130 MM HG: CPT | Performed by: INTERNAL MEDICINE

## 2024-05-13 ASSESSMENT — ENCOUNTER SYMPTOMS: SHORTNESS OF BREATH: 0

## 2024-05-13 NOTE — PROGRESS NOTES
CREATININE 1.08 03/04/2024 01:51 PM    GLUCOSE 119 03/04/2024 01:51 PM    CALCIUM 9.6 03/04/2024 01:51 PM          EKG        CXR/IMAGING        DEVICE INTERROGATION        OUTSIDE RECORDS REVIEW    Records from outside providers have been reviewed and summarized as noted in the HPI, past history and data review sections of this note, and reviewed with patient. .       ASSESSMENT and PLAN    Paroxysmal atrial fibrillation (HCC)  - Regular rhythm today. Continue home metoprolol succinate 100mg PO qday.  - [id number] 1 (HTN). Continue home apixaban 5mg PO BID.  - Patient would like to wait on ablation for now. Pending CPAP for MARA - AHI on recent sleep study 51.2, severe. Treatment of this should hopefully reduce his paroxysms of afib.  Primary hypertension  - BP acceptable. Continue current regimen.      Return in about 6 months (around 11/13/2024) for Routine follow up.          Thank you for allowing me to participate in this patient's care.  Please call or contact me if there are any questions or concerns regarding the above.      Neo Knox,   05/13/24  4:26 PM

## 2024-05-16 ENCOUNTER — TELEPHONE (OUTPATIENT)
Dept: SLEEP MEDICINE | Age: 64
End: 2024-05-16

## 2024-05-16 DIAGNOSIS — G47.34 NOCTURNAL HYPOXEMIA: ICD-10-CM

## 2024-05-16 DIAGNOSIS — G47.33 OSA (OBSTRUCTIVE SLEEP APNEA): Primary | ICD-10-CM

## 2024-05-16 NOTE — TELEPHONE ENCOUNTER
Patient is overall severe sleep apnea. Notified patient via [employer]. He has not been seen in sleep clinic. Will send message to schedulers for appointment as soon as possible.    EMMY Marti - CNP

## 2024-05-19 DIAGNOSIS — K21.9 GASTROESOPHAGEAL REFLUX DISEASE, UNSPECIFIED WHETHER ESOPHAGITIS PRESENT: ICD-10-CM

## 2024-05-20 RX ORDER — OMEPRAZOLE 40 MG/1
CAPSULE, DELAYED RELEASE ORAL
Qty: 90 CAPSULE | Refills: 1 | Status: SHIPPED | OUTPATIENT
Start: 2024-05-20

## 2024-05-28 NOTE — PROGRESS NOTES
[facility]  [address]  [place] [address]  [phone]    Patient Name:  Jose Sandoval Jr.  YOB: 1960      Office Visit 5/29/2024    CHIEF COMPLAINT:    Chief Complaint   Patient presents with    New Patient    Sleep Apnea       HISTORY OF PRESENT ILLNESS:      The patient presents in outpatient consultation at the request of Dr. Neo Knox for evaluation and management of obstructive sleep apnea. PMH includes HTN, pAFib, GERD, HLD and obesity.     The diagnostic polysomnography was notable for an apnea hypopnea index of 51.2 including 4 central apneas, 272 obstructive apneas and 148 hypopneas.  Oxygen desaturations are low as 72% were noted with SpO2 less than 89% for a total of 49.2 minutes of the test.   A subsequent CPAP titration study was conducted.  Significant cardiac arrhythmias were not evident.  The patient was noted to have 77.0 limb movements with a limb movement arousal index being about 0.6. CPAP levels as high as [id number] were performed.  CPAP was effective in eliminating disordered breathing. CPAP was tolerated by the patient.     Patient has never been diagnosed with sleep apnea before however does state that his father has sleep apnea.  He has been told that he snores, has witnessed apnea but is not regularly awakening gasping.  Denies any vivid dreams or parasomnias.  Denies any RLS symptoms including aching/crawling or leg cramps.  He is able to fall asleep within 5 minutes and may awaken twice at night.  He is able to easily go back to sleep if he does awaken.  Typically sleeping 9 PM-6 AM in the lateral position.  He is rested in the morning, and may have some daytime fatigue in the late afternoon and will nap on occasion.  Current London score is 11/24.  Does not often have morning headaches or dry mouth.    He was previously taking diphenhydramine however is not currently taking anything over-the-counter to help with

## 2024-05-29 ENCOUNTER — OFFICE VISIT (OUTPATIENT)
Dept: SLEEP MEDICINE | Age: 64
End: 2024-05-29
Payer: COMMERCIAL

## 2024-05-29 VITALS
TEMPERATURE: 97.9 F | SYSTOLIC BLOOD PRESSURE: 128 MMHG | RESPIRATION RATE: 18 BRPM | HEART RATE: 52 BPM | WEIGHT: 259 LBS | OXYGEN SATURATION: 96 % | DIASTOLIC BLOOD PRESSURE: 70 MMHG | HEIGHT: 67 IN | BODY MASS INDEX: 40.65 KG/M2

## 2024-05-29 DIAGNOSIS — I48.0 PAROXYSMAL ATRIAL FIBRILLATION (HCC): ICD-10-CM

## 2024-05-29 DIAGNOSIS — G47.33 OSA (OBSTRUCTIVE SLEEP APNEA): Primary | ICD-10-CM

## 2024-05-29 DIAGNOSIS — G47.34 NOCTURNAL HYPOXEMIA: ICD-10-CM

## 2024-05-29 DIAGNOSIS — E66.01 MORBID OBESITY WITH BMI OF 40.0-44.9, ADULT (HCC): ICD-10-CM

## 2024-05-29 PROCEDURE — 3074F SYST BP LT 130 MM HG: CPT | Performed by: STUDENT IN AN ORGANIZED HEALTH CARE EDUCATION/TRAINING PROGRAM

## 2024-05-29 PROCEDURE — 99203 OFFICE O/P NEW LOW 30 MIN: CPT | Performed by: STUDENT IN AN ORGANIZED HEALTH CARE EDUCATION/TRAINING PROGRAM

## 2024-05-29 PROCEDURE — 3078F DIAST BP <80 MM HG: CPT | Performed by: STUDENT IN AN ORGANIZED HEALTH CARE EDUCATION/TRAINING PROGRAM

## 2024-05-29 ASSESSMENT — SLEEP AND FATIGUE QUESTIONNAIRES
HOW LIKELY ARE YOU TO NOD OFF OR FALL ASLEEP WHILE SITTING INACTIVE IN A PUBLIC PLACE: SLIGHT CHANCE OF DOZING
HOW LIKELY ARE YOU TO NOD OFF OR FALL ASLEEP WHEN YOU ARE A PASSENGER IN A CAR FOR AN HOUR WITHOUT A BREAK: HIGH CHANCE OF DOZING
HOW LIKELY ARE YOU TO NOD OFF OR FALL ASLEEP WHILE SITTING AND READING: WOULD NEVER DOZE
ESS TOTAL SCORE: 11
HOW LIKELY ARE YOU TO NOD OFF OR FALL ASLEEP IN A CAR, WHILE STOPPED FOR A FEW MINUTES IN TRAFFIC: SLIGHT CHANCE OF DOZING
HOW LIKELY ARE YOU TO NOD OFF OR FALL ASLEEP WHILE LYING DOWN TO REST IN THE AFTERNOON WHEN CIRCUMSTANCES PERMIT: HIGH CHANCE OF DOZING
HOW LIKELY ARE YOU TO NOD OFF OR FALL ASLEEP WHILE WATCHING TV: MODERATE CHANCE OF DOZING
HOW LIKELY ARE YOU TO NOD OFF OR FALL ASLEEP WHILE SITTING AND TALKING TO SOMEONE: WOULD NEVER DOZE
HOW LIKELY ARE YOU TO NOD OFF OR FALL ASLEEP WHILE SITTING QUIETLY AFTER LUNCH WITHOUT ALCOHOL: SLIGHT CHANCE OF DOZING

## 2024-05-29 NOTE — PATIENT INSTRUCTIONS
The company who will be taking care of your CPAP supplies is:    Address: [address], [place] [address]  Phone: [phone] Option #1  Fax: [phone]    Patient Education        Sleep Apnea: Care Instructions  Overview     Sleep apnea means that you frequently stop breathing for 10 seconds or longer during sleep. It can be mild to severe, based on the number of times an hour that you stop breathing.  Blocked or narrowed airways in your nose, mouth, or throat can cause sleep apnea. Your airway can become blocked when your throat muscles and tongue relax during sleep.  You can help treat sleep apnea at home by making lifestyle changes. You also can use a CPAP breathing machine that keeps tissues in the throat from blocking your airway. You may use a mouth or nose device while you sleep to help keep your airway open. Surgery may be an option for some people. Surgery may be done to implant a nerve stimulation device in the chest that helps keep the airway open. Surgery can also be done to remove enlarged tissues that are blocking the throat.  Follow-up care is a key part of your treatment and safety. Be sure to make and go to all appointments, and call your doctor if you are having problems. It's also a good idea to know your test results and keep a list of the medicines you take.  How can you care for yourself at home?  Lose weight, if needed.  Sleep on your side. It may help mild apnea.  Avoid alcohol and medicines such as sleeping pills, opioids, or sedatives before bed.  Don't smoke. If you need help quitting, talk to your doctor.  Prop up the head of your bed.  Treat breathing problems, such as a stuffy nose, that are caused by a cold or allergies.  Try a continuous positive airway pressure (CPAP) breathing machine if your doctor recommends it.  If CPAP doesn't work for you, ask your doctor if you can try other masks, settings, or breathing machines.  Try oral breathing devices or other nasal

## 2024-06-01 DIAGNOSIS — E78.2 MIXED HYPERLIPIDEMIA: ICD-10-CM

## 2024-06-04 RX ORDER — ATORVASTATIN CALCIUM 40 MG/1
40 TABLET, FILM COATED ORAL DAILY
Qty: 90 TABLET | Refills: 1 | Status: SHIPPED | OUTPATIENT
Start: 2024-06-04

## 2024-08-28 DIAGNOSIS — I48.0 PAROXYSMAL ATRIAL FIBRILLATION (HCC): Primary | ICD-10-CM

## 2024-09-20 DIAGNOSIS — I10 ESSENTIAL (PRIMARY) HYPERTENSION: ICD-10-CM

## 2024-09-20 RX ORDER — AMLODIPINE BESYLATE 10 MG/1
TABLET ORAL
Qty: 90 TABLET | Refills: 1 | OUTPATIENT
Start: 2024-09-20

## 2024-09-20 RX ORDER — AMLODIPINE BESYLATE 10 MG/1
TABLET ORAL
Qty: 90 TABLET | Refills: 1 | Status: SHIPPED | OUTPATIENT
Start: 2024-09-20

## 2024-09-20 SDOH — ECONOMIC STABILITY: FOOD INSECURITY: WITHIN THE PAST 12 MONTHS, YOU WORRIED THAT YOUR FOOD WOULD RUN OUT BEFORE YOU GOT MONEY TO BUY MORE.: NEVER TRUE

## 2024-09-20 SDOH — ECONOMIC STABILITY: INCOME INSECURITY: HOW HARD IS IT FOR YOU TO PAY FOR THE VERY BASICS LIKE FOOD, HOUSING, MEDICAL CARE, AND HEATING?: NOT HARD AT ALL

## 2024-09-20 SDOH — ECONOMIC STABILITY: FOOD INSECURITY: WITHIN THE PAST 12 MONTHS, THE FOOD YOU BOUGHT JUST DIDN'T LAST AND YOU DIDN'T HAVE MONEY TO GET MORE.: NEVER TRUE

## 2024-09-20 ASSESSMENT — PATIENT HEALTH QUESTIONNAIRE - PHQ9
SUM OF ALL RESPONSES TO PHQ QUESTIONS 1-9: 0
SUM OF ALL RESPONSES TO PHQ9 QUESTIONS 1 & 2: 0
SUM OF ALL RESPONSES TO PHQ QUESTIONS 1-9: 0
1. LITTLE INTEREST OR PLEASURE IN DOING THINGS: NOT AT ALL
SUM OF ALL RESPONSES TO PHQ QUESTIONS 1-9: 0
SUM OF ALL RESPONSES TO PHQ9 QUESTIONS 1 & 2: 0
2. FEELING DOWN, DEPRESSED OR HOPELESS: NOT AT ALL
2. FEELING DOWN, DEPRESSED OR HOPELESS: NOT AT ALL
1. LITTLE INTEREST OR PLEASURE IN DOING THINGS: NOT AT ALL
SUM OF ALL RESPONSES TO PHQ QUESTIONS 1-9: 0

## 2024-09-23 ENCOUNTER — OFFICE VISIT (OUTPATIENT)
Dept: INTERNAL MEDICINE CLINIC | Facility: CLINIC | Age: 64
End: 2024-09-23
Payer: COMMERCIAL

## 2024-09-23 VITALS
BODY MASS INDEX: 40.81 KG/M2 | DIASTOLIC BLOOD PRESSURE: 80 MMHG | SYSTOLIC BLOOD PRESSURE: 136 MMHG | HEIGHT: 67 IN | WEIGHT: 260 LBS | HEART RATE: 80 BPM | OXYGEN SATURATION: 98 %

## 2024-09-23 DIAGNOSIS — I10 ESSENTIAL (PRIMARY) HYPERTENSION: ICD-10-CM

## 2024-09-23 DIAGNOSIS — I10 PRIMARY HYPERTENSION: ICD-10-CM

## 2024-09-23 DIAGNOSIS — E78.2 MIXED HYPERLIPIDEMIA: ICD-10-CM

## 2024-09-23 DIAGNOSIS — I48.0 PAROXYSMAL ATRIAL FIBRILLATION (HCC): Primary | ICD-10-CM

## 2024-09-23 DIAGNOSIS — K21.9 GASTROESOPHAGEAL REFLUX DISEASE, UNSPECIFIED WHETHER ESOPHAGITIS PRESENT: ICD-10-CM

## 2024-09-23 PROCEDURE — 3079F DIAST BP 80-89 MM HG: CPT | Performed by: FAMILY MEDICINE

## 2024-09-23 PROCEDURE — 99214 OFFICE O/P EST MOD 30 MIN: CPT | Performed by: FAMILY MEDICINE

## 2024-09-23 PROCEDURE — 3075F SYST BP GE 130 - 139MM HG: CPT | Performed by: FAMILY MEDICINE

## 2024-09-23 RX ORDER — AMLODIPINE BESYLATE 10 MG/1
TABLET ORAL
Qty: 90 TABLET | Refills: 1 | Status: SHIPPED | OUTPATIENT
Start: 2024-09-23

## 2024-09-23 RX ORDER — OMEPRAZOLE 40 MG/1
CAPSULE, DELAYED RELEASE ORAL
Qty: 90 CAPSULE | Refills: 1 | Status: SHIPPED | OUTPATIENT
Start: 2024-09-23

## 2024-09-23 RX ORDER — LISINOPRIL 40 MG/1
40 TABLET ORAL DAILY
Qty: 90 TABLET | Refills: 1 | Status: SHIPPED | OUTPATIENT
Start: 2024-09-23

## 2024-09-23 RX ORDER — ATORVASTATIN CALCIUM 40 MG/1
40 TABLET, FILM COATED ORAL DAILY
Qty: 90 TABLET | Refills: 1 | Status: SHIPPED | OUTPATIENT
Start: 2024-09-23

## 2024-10-16 ENCOUNTER — CLINICAL DOCUMENTATION (OUTPATIENT)
Age: 64
End: 2024-10-16

## 2024-10-16 ENCOUNTER — TELEPHONE (OUTPATIENT)
Age: 64
End: 2024-10-16

## 2024-10-16 ENCOUNTER — INITIAL CONSULT (OUTPATIENT)
Age: 64
End: 2024-10-16
Payer: COMMERCIAL

## 2024-10-16 VITALS
SYSTOLIC BLOOD PRESSURE: 138 MMHG | HEIGHT: 67 IN | DIASTOLIC BLOOD PRESSURE: 78 MMHG | BODY MASS INDEX: 40.97 KG/M2 | WEIGHT: 261 LBS | HEART RATE: 52 BPM

## 2024-10-16 DIAGNOSIS — I48.0 PAROXYSMAL ATRIAL FIBRILLATION (HCC): Primary | ICD-10-CM

## 2024-10-16 PROCEDURE — 93000 ELECTROCARDIOGRAM COMPLETE: CPT | Performed by: INTERNAL MEDICINE

## 2024-10-16 PROCEDURE — 3078F DIAST BP <80 MM HG: CPT | Performed by: INTERNAL MEDICINE

## 2024-10-16 PROCEDURE — 3075F SYST BP GE 130 - 139MM HG: CPT | Performed by: INTERNAL MEDICINE

## 2024-10-16 PROCEDURE — 99204 OFFICE O/P NEW MOD 45 MIN: CPT | Performed by: INTERNAL MEDICINE

## 2024-10-16 NOTE — PROGRESS NOTES
[facility], [address], [address] [place] [address]  PHONE: [phone]  Jose Sandoval Jr.  1960    Chief Complant:    Chief Complaint   Patient presents with    Consultation    Atrial Fibrillation      Consultation is requested by [unfilled] for evaluation of Consultation and Atrial Fibrillation    Reason for Consultation: pAF    History:  Jose Sandoval Jr. is a very pleasant 64 y.o. male with a past medical and cardiac history significant for HTN, HLD, obesity, GERD and paroxysmal atrial fibrillation and presents for EP consultation for pAF. He was diagnosed with afib earlier this year. He reports episodes very few weeks, lasting 4-5 day, associated with anxiety, palpitations, rapid irregular HR. Denies chest pain, syncope, SOB.    Cardiac PMH: (Old records have been reviewed and summarized below)  TTE (3/15/24): EF 60-65%    Reviewed office note Dr. Knox 5/13/24    Past Medical History, Past Surgical History, Family history, Social History, and Medications were all reviewed with the patient today and updated as necessary.     Current Outpatient Medications   Medication Sig Dispense Refill    amLODIPine (NORVASC) 10 MG tablet TAKE 1 TABLET BY MOUTH EVERY DAY 90 tablet 1    lisinopril (PRINIVIL;ZESTRIL) 40 MG tablet Take 1 tablet by mouth daily 90 tablet 1    omeprazole (PRILOSEC) 40 MG delayed release capsule TAKE 1 CAPSULE BY MOUTH EVERY DAY 90 capsule 1    atorvastatin (LIPITOR) 40 MG tablet Take 1 tablet by mouth daily 90 tablet 1    Cholecalciferol (VITAMIN D-3 PO) Take by mouth daily      KRILL OIL PO Take 500 mg by mouth daily      metoprolol succinate (TOPROL XL) 100 MG extended release tablet Take 1 tablet by mouth daily 30 tablet 11    apixaban (ELIQUIS) 5 MG TABS tablet Take 1 tablet by mouth 2 times daily 180 tablet 3     No current facility-administered medications for this visit.     No Known Allergies  [unfilled]    Past Medical History:   Diagnosis

## 2024-10-16 NOTE — PROGRESS NOTES
Real AF study discussed in length with patient and his spouse at Dr. Garcia' request. Patient was given time to review the consent. After review, patient was able to verbalize understanding of the study's purpose, design, risks, and benefits. The patient understands that this study is completely voluntary. Financial aspects of the study were reviewed with the patient and he denies any questions at present. Other alternatives were also discussed with patient. After all questions were answered, patient verbalized his desire to be a part of the study. Informed consent was signed prior to starting any study procedure. he was given a copy of his consent, as well as, a copy was placed in his chart. he has no other questions at this time.

## 2024-11-04 DIAGNOSIS — I48.0 PAROXYSMAL ATRIAL FIBRILLATION (HCC): ICD-10-CM

## 2024-11-04 LAB
ANION GAP SERPL CALC-SCNC: 10 MMOL/L (ref 7–16)
BUN SERPL-MCNC: 20 MG/DL (ref 8–23)
CALCIUM SERPL-MCNC: 9 MG/DL (ref 8.8–10.2)
CHLORIDE SERPL-SCNC: 104 MMOL/L (ref 98–107)
CO2 SERPL-SCNC: 24 MMOL/L (ref 20–29)
CREAT SERPL-MCNC: 0.9 MG/DL (ref 0.8–1.3)
ERYTHROCYTE [DISTWIDTH] IN BLOOD BY AUTOMATED COUNT: 12.5 % (ref 11.9–14.6)
GLUCOSE SERPL-MCNC: 166 MG/DL (ref 70–99)
HCT VFR BLD AUTO: 48.3 % (ref 41.1–50.3)
HGB BLD-MCNC: 15.8 G/DL (ref 13.6–17.2)
MAGNESIUM SERPL-MCNC: 1.7 MG/DL (ref 1.8–2.4)
MCH RBC QN AUTO: 29.3 PG (ref 26.1–32.9)
MCHC RBC AUTO-ENTMCNC: 32.7 G/DL (ref 31.4–35)
MCV RBC AUTO: 89.6 FL (ref 82–102)
NRBC # BLD: 0 K/UL (ref 0–0.2)
PLATELET # BLD AUTO: 203 K/UL (ref 150–450)
PMV BLD AUTO: 11.8 FL (ref 9.4–12.3)
POTASSIUM SERPL-SCNC: 4.4 MMOL/L (ref 3.5–5.1)
RBC # BLD AUTO: 5.39 M/UL (ref 4.23–5.6)
SODIUM SERPL-SCNC: 138 MMOL/L (ref 136–145)
WBC # BLD AUTO: 7.6 K/UL (ref 4.3–11.1)

## 2024-11-08 NOTE — PROGRESS NOTES
Patient pre-assessment complete for Atrial fib ablation with Dr Garcia scheduled for 24, arrival time 6am. Patient verified using . Patient instructed to bring a list of all home medications on the day of procedure. NPO status reinforced.  Patient instructed to HOLD eliquis on  & lisinopril the am of procedure. Instructed they can take all other medications excluding vitamins & supplements. Patient verbalizes understanding of all instructions & denies any questions at this time.    Pt encouraged to increase fluid intake on  & could have clear liquids until 2 hrs prior to arrival

## 2024-11-10 ENCOUNTER — ANESTHESIA EVENT (OUTPATIENT)
Dept: CARDIAC CATH/INVASIVE PROCEDURES | Age: 64
End: 2024-11-10
Payer: COMMERCIAL

## 2024-11-11 ENCOUNTER — ANESTHESIA (OUTPATIENT)
Dept: CARDIAC CATH/INVASIVE PROCEDURES | Age: 64
End: 2024-11-11
Payer: COMMERCIAL

## 2024-11-11 ENCOUNTER — HOSPITAL ENCOUNTER (OUTPATIENT)
Age: 64
Setting detail: OUTPATIENT SURGERY
Discharge: HOME | End: 2024-11-11
Attending: INTERNAL MEDICINE | Admitting: INTERNAL MEDICINE
Payer: COMMERCIAL

## 2024-11-11 ENCOUNTER — APPOINTMENT (OUTPATIENT)
Dept: CARDIAC CATH/INVASIVE PROCEDURES | Age: 64
End: 2024-11-11
Attending: INTERNAL MEDICINE
Payer: COMMERCIAL

## 2024-11-11 VITALS
WEIGHT: 250 LBS | HEIGHT: 67 IN | TEMPERATURE: 98.6 F | BODY MASS INDEX: 39.24 KG/M2 | SYSTOLIC BLOOD PRESSURE: 143 MMHG | OXYGEN SATURATION: 93 % | RESPIRATION RATE: 16 BRPM | HEART RATE: 58 BPM | DIASTOLIC BLOOD PRESSURE: 66 MMHG

## 2024-11-11 DIAGNOSIS — I48.0 PAROXYSMAL ATRIAL FIBRILLATION (HCC): ICD-10-CM

## 2024-11-11 LAB
ACT BLD: 320 SECS (ref 70–128)
ECHO BSA: 2.32 M2
ECHO BSA: 2.32 M2
GLUCOSE BLD STRIP.AUTO-MCNC: 141 MG/DL (ref 65–100)
MAGNESIUM SERPL-MCNC: 1.7 MG/DL (ref 1.8–2.4)
SERVICE CMNT-IMP: ABNORMAL

## 2024-11-11 PROCEDURE — C1759 CATH, INTRA ECHOCARDIOGRAPHY: HCPCS | Performed by: INTERNAL MEDICINE

## 2024-11-11 PROCEDURE — 6370000000 HC RX 637 (ALT 250 FOR IP): Performed by: INTERNAL MEDICINE

## 2024-11-11 PROCEDURE — 82962 GLUCOSE BLOOD TEST: CPT

## 2024-11-11 PROCEDURE — 2709999900 HC NON-CHARGEABLE SUPPLY: Performed by: INTERNAL MEDICINE

## 2024-11-11 PROCEDURE — C1894 INTRO/SHEATH, NON-LASER: HCPCS | Performed by: INTERNAL MEDICINE

## 2024-11-11 PROCEDURE — 3700000000 HC ANESTHESIA ATTENDED CARE: Performed by: INTERNAL MEDICINE

## 2024-11-11 PROCEDURE — 6360000002 HC RX W HCPCS

## 2024-11-11 PROCEDURE — 93622 COMP EP EVAL L VENTR PAC&REC: CPT | Performed by: INTERNAL MEDICINE

## 2024-11-11 PROCEDURE — 7100000000 HC PACU RECOVERY - FIRST 15 MIN: Performed by: INTERNAL MEDICINE

## 2024-11-11 PROCEDURE — 7100000001 HC PACU RECOVERY - ADDTL 15 MIN: Performed by: INTERNAL MEDICINE

## 2024-11-11 PROCEDURE — 93312 ECHO TRANSESOPHAGEAL: CPT

## 2024-11-11 PROCEDURE — 6370000000 HC RX 637 (ALT 250 FOR IP)

## 2024-11-11 PROCEDURE — C1732 CATH, EP, DIAG/ABL, 3D/VECT: HCPCS | Performed by: INTERNAL MEDICINE

## 2024-11-11 PROCEDURE — 2500000003 HC RX 250 WO HCPCS

## 2024-11-11 PROCEDURE — C1760 CLOSURE DEV, VASC: HCPCS | Performed by: INTERNAL MEDICINE

## 2024-11-11 PROCEDURE — 85347 COAGULATION TIME ACTIVATED: CPT

## 2024-11-11 PROCEDURE — C1893 INTRO/SHEATH, FIXED,NON-PEEL: HCPCS | Performed by: INTERNAL MEDICINE

## 2024-11-11 PROCEDURE — 6360000002 HC RX W HCPCS: Performed by: INTERNAL MEDICINE

## 2024-11-11 PROCEDURE — 93655 ICAR CATH ABLTJ DSCRT ARRHYT: CPT | Performed by: INTERNAL MEDICINE

## 2024-11-11 PROCEDURE — C1730 CATH, EP, 19 OR FEW ELECT: HCPCS | Performed by: INTERNAL MEDICINE

## 2024-11-11 PROCEDURE — 93656 COMPRE EP EVAL ABLTJ ATR FIB: CPT | Performed by: INTERNAL MEDICINE

## 2024-11-11 PROCEDURE — 2720000010 HC SURG SUPPLY STERILE: Performed by: INTERNAL MEDICINE

## 2024-11-11 PROCEDURE — 2580000003 HC RX 258

## 2024-11-11 PROCEDURE — 83735 ASSAY OF MAGNESIUM: CPT

## 2024-11-11 PROCEDURE — 3700000001 HC ADD 15 MINUTES (ANESTHESIA): Performed by: INTERNAL MEDICINE

## 2024-11-11 PROCEDURE — 93657 TX L/R ATRIAL FIB ADDL: CPT | Performed by: INTERNAL MEDICINE

## 2024-11-11 RX ORDER — SODIUM CHLORIDE 9 MG/ML
INJECTION, SOLUTION INTRAVENOUS PRN
Status: DISCONTINUED | OUTPATIENT
Start: 2024-11-11 | End: 2024-11-11 | Stop reason: HOSPADM

## 2024-11-11 RX ORDER — NALOXONE HYDROCHLORIDE 0.4 MG/ML
INJECTION, SOLUTION INTRAMUSCULAR; INTRAVENOUS; SUBCUTANEOUS PRN
Status: DISCONTINUED | OUTPATIENT
Start: 2024-11-11 | End: 2024-11-11 | Stop reason: HOSPADM

## 2024-11-11 RX ORDER — ADENOSINE 3 MG/ML
INJECTION, SOLUTION INTRAVENOUS PRN
Status: DISCONTINUED | OUTPATIENT
Start: 2024-11-11 | End: 2024-11-11 | Stop reason: HOSPADM

## 2024-11-11 RX ORDER — ONDANSETRON 2 MG/ML
INJECTION INTRAMUSCULAR; INTRAVENOUS
Status: DISCONTINUED | OUTPATIENT
Start: 2024-11-11 | End: 2024-11-11 | Stop reason: SDUPTHER

## 2024-11-11 RX ORDER — HEPARIN SODIUM 10000 [USP'U]/100ML
INJECTION, SOLUTION INTRAVENOUS
Status: DISCONTINUED | OUTPATIENT
Start: 2024-11-11 | End: 2024-11-11 | Stop reason: SDUPTHER

## 2024-11-11 RX ORDER — SODIUM CHLORIDE, SODIUM LACTATE, POTASSIUM CHLORIDE, CALCIUM CHLORIDE 600; 310; 30; 20 MG/100ML; MG/100ML; MG/100ML; MG/100ML
INJECTION, SOLUTION INTRAVENOUS CONTINUOUS
Status: DISCONTINUED | OUTPATIENT
Start: 2024-11-11 | End: 2024-11-11 | Stop reason: HOSPADM

## 2024-11-11 RX ORDER — SUCRALFATE 1 G/1
1 TABLET ORAL 4 TIMES DAILY
Qty: 120 TABLET | Refills: 0 | Status: SHIPPED | OUTPATIENT
Start: 2024-11-11

## 2024-11-11 RX ORDER — OXYCODONE HYDROCHLORIDE 5 MG/1
5 TABLET ORAL
Status: DISCONTINUED | OUTPATIENT
Start: 2024-11-11 | End: 2024-11-11 | Stop reason: HOSPADM

## 2024-11-11 RX ORDER — SODIUM CHLORIDE 0.9 % (FLUSH) 0.9 %
5-40 SYRINGE (ML) INJECTION PRN
Status: DISCONTINUED | OUTPATIENT
Start: 2024-11-11 | End: 2024-11-11 | Stop reason: HOSPADM

## 2024-11-11 RX ORDER — DEXTROSE MONOHYDRATE 100 MG/ML
INJECTION, SOLUTION INTRAVENOUS CONTINUOUS PRN
Status: DISCONTINUED | OUTPATIENT
Start: 2024-11-11 | End: 2024-11-11 | Stop reason: HOSPADM

## 2024-11-11 RX ORDER — PROPOFOL 10 MG/ML
INJECTION, EMULSION INTRAVENOUS
Status: DISCONTINUED | OUTPATIENT
Start: 2024-11-11 | End: 2024-11-11 | Stop reason: SDUPTHER

## 2024-11-11 RX ORDER — HYDROMORPHONE HYDROCHLORIDE 2 MG/ML
0.25 INJECTION, SOLUTION INTRAMUSCULAR; INTRAVENOUS; SUBCUTANEOUS EVERY 5 MIN PRN
Status: DISCONTINUED | OUTPATIENT
Start: 2024-11-11 | End: 2024-11-11 | Stop reason: HOSPADM

## 2024-11-11 RX ORDER — DEXAMETHASONE SODIUM PHOSPHATE 10 MG/ML
INJECTION INTRAMUSCULAR; INTRAVENOUS
Status: DISCONTINUED | OUTPATIENT
Start: 2024-11-11 | End: 2024-11-11 | Stop reason: SDUPTHER

## 2024-11-11 RX ORDER — PROTAMINE SULFATE 10 MG/ML
INJECTION, SOLUTION INTRAVENOUS
Status: DISCONTINUED | OUTPATIENT
Start: 2024-11-11 | End: 2024-11-11 | Stop reason: SDUPTHER

## 2024-11-11 RX ORDER — PANTOPRAZOLE SODIUM 40 MG/1
40 TABLET, DELAYED RELEASE ORAL
Qty: 60 TABLET | Refills: 0 | Status: SHIPPED | OUTPATIENT
Start: 2024-11-11

## 2024-11-11 RX ORDER — LIDOCAINE HYDROCHLORIDE 20 MG/ML
INJECTION, SOLUTION EPIDURAL; INFILTRATION; INTRACAUDAL; PERINEURAL
Status: DISCONTINUED | OUTPATIENT
Start: 2024-11-11 | End: 2024-11-11 | Stop reason: SDUPTHER

## 2024-11-11 RX ORDER — SODIUM CHLORIDE 0.9 % (FLUSH) 0.9 %
SYRINGE (ML) INJECTION
Status: DISCONTINUED | OUTPATIENT
Start: 2024-11-11 | End: 2024-11-11 | Stop reason: SDUPTHER

## 2024-11-11 RX ORDER — SODIUM CHLORIDE 0.9 % (FLUSH) 0.9 %
5-40 SYRINGE (ML) INJECTION EVERY 12 HOURS SCHEDULED
Status: DISCONTINUED | OUTPATIENT
Start: 2024-11-11 | End: 2024-11-11 | Stop reason: HOSPADM

## 2024-11-11 RX ORDER — IBUPROFEN 600 MG/1
1 TABLET ORAL PRN
Status: DISCONTINUED | OUTPATIENT
Start: 2024-11-11 | End: 2024-11-11 | Stop reason: HOSPADM

## 2024-11-11 RX ORDER — ROCURONIUM BROMIDE 10 MG/ML
INJECTION, SOLUTION INTRAVENOUS
Status: DISCONTINUED | OUTPATIENT
Start: 2024-11-11 | End: 2024-11-11 | Stop reason: SDUPTHER

## 2024-11-11 RX ORDER — ACETAMINOPHEN 325 MG/1
650 TABLET ORAL ONCE
Status: COMPLETED | OUTPATIENT
Start: 2024-11-11 | End: 2024-11-11

## 2024-11-11 RX ORDER — ALBUTEROL SULFATE 90 UG/1
INHALANT RESPIRATORY (INHALATION)
Status: DISCONTINUED | OUTPATIENT
Start: 2024-11-11 | End: 2024-11-11 | Stop reason: SDUPTHER

## 2024-11-11 RX ORDER — HALOPERIDOL 5 MG/ML
1 INJECTION INTRAMUSCULAR
Status: DISCONTINUED | OUTPATIENT
Start: 2024-11-11 | End: 2024-11-11 | Stop reason: HOSPADM

## 2024-11-11 RX ORDER — ONDANSETRON 2 MG/ML
4 INJECTION INTRAMUSCULAR; INTRAVENOUS
Status: DISCONTINUED | OUTPATIENT
Start: 2024-11-11 | End: 2024-11-11 | Stop reason: HOSPADM

## 2024-11-11 RX ORDER — GLYCOPYRROLATE 0.2 MG/ML
INJECTION INTRAMUSCULAR; INTRAVENOUS
Status: DISCONTINUED | OUTPATIENT
Start: 2024-11-11 | End: 2024-11-11 | Stop reason: SDUPTHER

## 2024-11-11 RX ORDER — LIDOCAINE HYDROCHLORIDE 10 MG/ML
1 INJECTION, SOLUTION INFILTRATION; PERINEURAL
Status: DISCONTINUED | OUTPATIENT
Start: 2024-11-11 | End: 2024-11-11 | Stop reason: HOSPADM

## 2024-11-11 RX ORDER — COLCHICINE 0.6 MG/1
0.6 TABLET ORAL DAILY
Qty: 30 TABLET | Refills: 0 | Status: SHIPPED | OUTPATIENT
Start: 2024-11-11

## 2024-11-11 RX ORDER — HEPARIN SODIUM 1000 [USP'U]/ML
INJECTION, SOLUTION INTRAVENOUS; SUBCUTANEOUS
Status: DISCONTINUED | OUTPATIENT
Start: 2024-11-11 | End: 2024-11-11 | Stop reason: SDUPTHER

## 2024-11-11 RX ADMIN — GLYCOPYRROLATE 0.2 MG: 0.2 INJECTION INTRAMUSCULAR; INTRAVENOUS at 08:26

## 2024-11-11 RX ADMIN — PROPOFOL 50 MG: 10 INJECTION, EMULSION INTRAVENOUS at 07:20

## 2024-11-11 RX ADMIN — PROPOFOL 50 MG: 10 INJECTION, EMULSION INTRAVENOUS at 08:08

## 2024-11-11 RX ADMIN — ONDANSETRON 4 MG: 2 INJECTION INTRAMUSCULAR; INTRAVENOUS at 07:25

## 2024-11-11 RX ADMIN — HEPARIN SODIUM 40 UNITS/KG/HR: 10000 INJECTION, SOLUTION INTRAVENOUS at 07:43

## 2024-11-11 RX ADMIN — HEPARIN SODIUM 9000 UNITS: 1000 INJECTION INTRAVENOUS; SUBCUTANEOUS at 07:37

## 2024-11-11 RX ADMIN — ROCURONIUM BROMIDE 25 MG: 10 INJECTION, SOLUTION INTRAVENOUS at 07:45

## 2024-11-11 RX ADMIN — SODIUM CHLORIDE, PRESERVATIVE FREE 20 ML: 5 INJECTION INTRAVENOUS at 07:39

## 2024-11-11 RX ADMIN — PROTAMINE SULFATE 10 MG: 10 INJECTION, SOLUTION INTRAVENOUS at 08:39

## 2024-11-11 RX ADMIN — SODIUM CHLORIDE, PRESERVATIVE FREE 30 ML: 5 INJECTION INTRAVENOUS at 07:13

## 2024-11-11 RX ADMIN — ROCURONIUM BROMIDE 10 MG: 10 INJECTION, SOLUTION INTRAVENOUS at 08:09

## 2024-11-11 RX ADMIN — PROTAMINE SULFATE 20 MG: 10 INJECTION, SOLUTION INTRAVENOUS at 08:41

## 2024-11-11 RX ADMIN — PROTAMINE SULFATE 30 MG: 10 INJECTION, SOLUTION INTRAVENOUS at 08:43

## 2024-11-11 RX ADMIN — HEPARIN SODIUM 9000 UNITS: 1000 INJECTION INTRAVENOUS; SUBCUTANEOUS at 07:43

## 2024-11-11 RX ADMIN — PROPOFOL 250 MG: 10 INJECTION, EMULSION INTRAVENOUS at 07:13

## 2024-11-11 RX ADMIN — PROPOFOL 60 MG: 10 INJECTION, EMULSION INTRAVENOUS at 07:42

## 2024-11-11 RX ADMIN — ROCURONIUM BROMIDE 50 MG: 10 INJECTION, SOLUTION INTRAVENOUS at 07:13

## 2024-11-11 RX ADMIN — SODIUM CHLORIDE, PRESERVATIVE FREE 10 ML: 5 INJECTION INTRAVENOUS at 07:25

## 2024-11-11 RX ADMIN — SODIUM CHLORIDE, PRESERVATIVE FREE 5 ML: 5 INJECTION INTRAVENOUS at 08:26

## 2024-11-11 RX ADMIN — DEXAMETHASONE SODIUM PHOSPHATE 10 MG: 10 INJECTION INTRAMUSCULAR; INTRAVENOUS at 07:25

## 2024-11-11 RX ADMIN — LIDOCAINE HYDROCHLORIDE 100 MG: 20 INJECTION, SOLUTION EPIDURAL; INFILTRATION; INTRACAUDAL; PERINEURAL at 07:13

## 2024-11-11 RX ADMIN — SODIUM CHLORIDE, PRESERVATIVE FREE 20 ML: 5 INJECTION INTRAVENOUS at 08:09

## 2024-11-11 RX ADMIN — SODIUM CHLORIDE, PRESERVATIVE FREE 10 ML: 5 INJECTION INTRAVENOUS at 07:20

## 2024-11-11 RX ADMIN — SUGAMMADEX 300 MG: 100 INJECTION, SOLUTION INTRAVENOUS at 08:43

## 2024-11-11 RX ADMIN — Medication 2 PUFF: at 08:39

## 2024-11-11 RX ADMIN — PROTAMINE SULFATE 15 MG: 10 INJECTION, SOLUTION INTRAVENOUS at 08:40

## 2024-11-11 RX ADMIN — PROTAMINE SULFATE 20 MG: 10 INJECTION, SOLUTION INTRAVENOUS at 08:42

## 2024-11-11 RX ADMIN — Medication 2 PUFF: at 08:17

## 2024-11-11 RX ADMIN — SODIUM CHLORIDE, PRESERVATIVE FREE 20 ML: 5 INJECTION INTRAVENOUS at 07:43

## 2024-11-11 RX ADMIN — PROTAMINE SULFATE 5 MG: 10 INJECTION, SOLUTION INTRAVENOUS at 08:38

## 2024-11-11 RX ADMIN — ACETAMINOPHEN 650 MG: 325 TABLET ORAL at 10:13

## 2024-11-11 ASSESSMENT — PAIN - FUNCTIONAL ASSESSMENT: PAIN_FUNCTIONAL_ASSESSMENT: NONE - DENIES PAIN

## 2024-11-11 NOTE — PROGRESS NOTES
Patient received to CPRU room # 9  Ambulatory from [facility]. Patient scheduled for Afib ablation today with Dr Garcia. Procedure reviewed & questions answered, voiced good understanding consent obtained & placed on chart. All medications and medical history reviewed. Will prep patient per orders. Patient & family updated on plan of care.      The patient has a fraility score of 3-MANAGING WELL, based on ability to perform ADLs indepenently.

## 2024-11-11 NOTE — DISCHARGE INSTRUCTIONS
Atrial Fib Ablation Discharge Instructions    1 - Check the puncture site frequently for swelling or bleeding. If you see any bleeding, lie down and apply pressure over the area with a clean town or washcloth. Notify your doctor for any redness, swelling, drainage or oozing from the puncture site. Notify your doctor for any fever or chills.    2 - If the leg with the puncture becomes cold, numb or painful, call [facility] at  [phone].    3 - Activity should be limited for the next 48 hours. Climb stairs as little as possible and avoid any stooping, bending or strenuous activity for 48 hours. No heavy lifting (anything over 10 pounds) for three days.    4 - Do not drive for the first 24 hours post ablation.    5 - You may resume your usual diet. Drink more fluids than usual.    6 - Have a responsible person drive you home and stay with you for at least 24 hours after your ablation.    7 -You may remove the bandage from your Right & Left Groin in 24 hours. You may shower in 24 hours. No tub baths, hot tubs or swimming for one week. Do not place any lotions, creams, powders, ointments over the puncture site for one week. You may place a clean band-aid over the puncture site each day for 5 days. Change this daily.    8 - Continue medicines for now including your blood thinner ***, and your rhythm medicine ***. These medicines should be continued until EP follow up.     9 - You will need to start Carafate, Colchicine and Protonix for one month. This is to protect your esophagus from a possible injury during the ablation procedure.     10 - If chest pain occurs (especially when taking a deep breath), it is likely due to pericarditis or inflammation around your heart sac which is related to the ablation procedure. It usually responds to ibuprofen at 400-600 mg every 6-8 hours as needed. If feels severe or unrelieved, please call office to discuss symptoms with the triage

## 2024-11-11 NOTE — PROGRESS NOTES
Assisted OOB & ambulated to BR & on unit. Tolerated activity without difficulty. Right and left groin without bleeding or hematoma

## 2024-11-11 NOTE — ANESTHESIA POSTPROCEDURE EVALUATION
Department of Anesthesiology  Postprocedure Note    Patient: Jose Sandoval Jr.  MRN: [id number]  YOB: 1960  Date of evaluation: 11/11/2024    Procedure Summary       Date: 11/11/24 Room / Location: [facility] 2 ALL EVENTS / [facility] CARDIAC CATH LAB    Anesthesia Start: 0658 Anesthesia Stop: 0905    Procedure: Ablation A-fib w complete ep study Diagnosis:       Paroxysmal atrial fibrillation (HCC)      (Paroxysmal atrial fibrillation (HCC) [I48.0])    Providers: Jesus Garcia MD Responsible Provider: Jen Casillas MD    Anesthesia Type: General ASA Status: 3            Anesthesia Type: General    Rickie Phase I: Rickie Score: 10    Rikcie Phase II:      Anesthesia Post Evaluation    Patient location during evaluation: PACU  Patient participation: complete - patient participated  Level of consciousness: awake and alert  Airway patency: patent  Nausea: well controlled.  Cardiovascular status: acceptable.  Respiratory status: acceptable  Hydration status: stable  Pain management: adequate    There were no known notable events for this encounter.

## 2024-11-11 NOTE — PROGRESS NOTES
TRANSFER - IN REPORT:    Verbal report received from PACU, RN on Jose Rileyladonna Ball. being received from PACU for routine progression of patient care      Report consisted of patient’s Situation, Background, Assessment and Recommendations(SBAR).     Information from the following report(s) Procedure Summary was reviewed with the receiving nurse.    Opportunity for questions and clarification was provided.      Assessment completed upon patient’s arrival to unit and care assumed.

## 2024-11-11 NOTE — ANESTHESIA PRE PROCEDURE
reviewed      Echocardiogram reviewed               ROS comment: TTE 10/2024:  ·  Left Ventricle: Normal left ventricular systolic function. EF by 2D Simpsons Biplane is 57%. Left ventricle size is normal. Normal wall thickness. Normal wall motion. Normal diastolic function.  ·  Right Ventricle: Right ventricle size is normal. Normal systolic function.  ·  Mitral Valve: Mild regurgitation.  ·  Left Atrium: Left atrial volume index is normal (16-34 mL/m2).  ·  Aorta: Normal sized sinus of Valsalva. Ao root diameter is 3.1 cm. Mildly dilated ascending aorta. Ao ascending diameter is 3.6 cm.  ·  IVC/SVC: IVC diameter is less than or equal to 21 mm and decreases greater than 50% during inspiration; therefore the estimated right atrial pressure is normal (~3 mmHg).       Neuro/Psych:               GI/Hepatic/Renal:   (+) GERD: well controlled         ROS comment: BMI 39.   Endo/Other:    (+) blood dyscrasia: anticoagulation therapy:..                 Abdominal:             Vascular:          Other Findings:             Anesthesia Plan      general     ASA 3     (GETA, glidescope/monroy  Last dose Eliquis yesterday morning)  Induction: intravenous.    MIPS: Postoperative opioids intended.  Anesthetic plan and risks discussed with patient and spouse.    Use of blood products discussed with patient whom consented to blood products.                      Jen Casillas MD   11/11/2024

## 2024-11-11 NOTE — PROGRESS NOTES
Assisted OOB & on unit. Tolerated activity without difficulty. Right and left groin without bleeding or hematoma

## 2024-11-11 NOTE — PROGRESS NOTES
Discharge instructions reviewed with pt. Med info sheets provided for all medications. Opportunity for questions provided. Pt voiced understanding of all discharge instructions.

## 2024-11-12 ENCOUNTER — TELEPHONE (OUTPATIENT)
Age: 64
End: 2024-11-12

## 2024-11-12 NOTE — TELEPHONE ENCOUNTER
After my ablation today, Dr. Garcia prescribed pantoprazole 40 mg, to be taken twice a day. [facility] said [employer] wouldn't pay for that medication, and they suggested omeprazole 40 mg as a substitution. I already take omeprazole 40 mg now...but I only take it once a day. Should I continue once a day or change to taking it twice a day (like the pantoprazole would have been)?     Patient had ablation 11-12-24

## 2024-11-12 NOTE — TELEPHONE ENCOUNTER
Pt is calling saying he needs to speak with Patricia in regards to a my chart message from her,He is requesting Patricia please call him back

## 2024-11-12 NOTE — TELEPHONE ENCOUNTER
Left message on voice mail and sent [employer] message//rochelle  He can take BID if it will be covered

## 2024-11-13 ENCOUNTER — OFFICE VISIT (OUTPATIENT)
Age: 64
End: 2024-11-13
Payer: COMMERCIAL

## 2024-11-13 VITALS
HEART RATE: 51 BPM | DIASTOLIC BLOOD PRESSURE: 68 MMHG | SYSTOLIC BLOOD PRESSURE: 130 MMHG | HEIGHT: 67 IN | BODY MASS INDEX: 41.26 KG/M2 | WEIGHT: 262.9 LBS

## 2024-11-13 DIAGNOSIS — I48.0 PAROXYSMAL ATRIAL FIBRILLATION (HCC): Primary | Chronic | ICD-10-CM

## 2024-11-13 DIAGNOSIS — I10 PRIMARY HYPERTENSION: Chronic | ICD-10-CM

## 2024-11-13 PROCEDURE — 3078F DIAST BP <80 MM HG: CPT | Performed by: INTERNAL MEDICINE

## 2024-11-13 PROCEDURE — 3075F SYST BP GE 130 - 139MM HG: CPT | Performed by: INTERNAL MEDICINE

## 2024-11-13 PROCEDURE — 99214 OFFICE O/P EST MOD 30 MIN: CPT | Performed by: INTERNAL MEDICINE

## 2024-11-13 RX ORDER — OMEPRAZOLE 40 MG/1
40 CAPSULE, DELAYED RELEASE ORAL 2 TIMES DAILY
COMMUNITY
Start: 2024-11-11

## 2024-11-13 ASSESSMENT — ENCOUNTER SYMPTOMS: SHORTNESS OF BREATH: 0

## 2024-11-13 NOTE — PROGRESS NOTES
[facility] CARDIOLOGY  [address] [place] [address], [facility] [address]  [place] [address]  PHONE: [phone]      24    NAME:  Jose Sandoval Jr.  : 1960  MRN: [id number]         SUBJECTIVE:   Jose Sandoval Jr. is a 64 y.o. male seen for a follow up visit regarding the following:     No chief complaint on file.           HPI:  Follow up  No chief complaint on file.   .      64 y.o. male with PMH HTN, HLD, GERD, obesity, paroxsymal atrial fibrillation, MARA presenting for routine follow up. Patient recently underwent afib ablation on 24. Patient reports one episodes of palpitations after his ablation with HR up to the 180s. He took a metoprolol 50mg at the time. Episode stopped after about 2 hours. No chest pain or dyspnea. He has severe diarrhea associated with colchicine which has been keeping him up at night. No other acute complaints.      Cardiac Medications       ACE Inhibitors       lisinopril (PRINIVIL;ZESTRIL) 40 MG tablet Take 1 tablet by mouth daily       Calcium Channel Blockers       amLODIPine (NORVASC) 10 MG tablet TAKE 1 TABLET BY MOUTH EVERY DAY       Beta Blockers Cardio-Selective       metoprolol succinate (TOPROL XL) 100 MG extended release tablet Take 1 tablet by mouth daily       HMG CoA Reductase Inhibitors       atorvastatin (LIPITOR) 40 MG tablet Take 1 tablet by mouth daily       Direct Factor Xa Inhibitors       apixaban (ELIQUIS) 5 MG TABS tablet Take 1 tablet by mouth 2 times daily                  Past Medical History, Past Surgical History, Family history, Social History, and Medications were all reviewed with the patient today and updated as necessary.     Prior to Admission medications    Medication Sig Start Date End Date Taking? Authorizing Provider   pantoprazole (PROTONIX) 40 MG tablet Take 1 tablet by mouth 2 times daily (before meals) 24   Jesus Garcia MD   sucralfate (CARAFATE) 1 GM tablet Take 1 tablet by mouth 4 times daily 24

## 2024-12-03 RX ORDER — SUCRALFATE 1 G/1
1 TABLET ORAL 4 TIMES DAILY
Qty: 360 TABLET | Refills: 1 | OUTPATIENT
Start: 2024-12-03

## 2024-12-03 RX ORDER — COLCHICINE 0.6 MG/1
0.6 TABLET ORAL DAILY
Qty: 90 TABLET | Refills: 1 | OUTPATIENT
Start: 2024-12-03

## 2024-12-13 NOTE — PROGRESS NOTES
lb)   11/13/24 119.3 kg (262 lb 14.4 oz)   11/11/24 113.4 kg (250 lb)     BP Readings from Last 3 Encounters:   12/16/24 138/74   11/13/24 130/68   11/11/24 (!) 143/66     Gen: well appearing, well developed, NAD  Eyes: Pupils equal, EOMI  CV: RRR, no M/R/G, normal JVD, normal distal pulses, no CHARLENE  Pulm: CTAB, no accessory muscle uses, no wheezes, crackles  GI: soft, NT, ND  Neuro: Alert and oriented    Medical problems and test results were reviewed with the patient today.     No results found for any visits on 12/16/24.    EKG:  (EKG has been independently visualized by me with interpretation below)  Sinus Bradycardia, rate 51, normal axis    Jose Zuniga \"Fahad\" was seen today for follow-up from hospital and atrial fibrillation.    Diagnoses and all orders for this visit:    Paroxysmal atrial fibrillation (HCC)  -     EKG 12 Lead    Primary hypertension    Mixed hyperlipidemia      ASSESSMENT and PLAN  1. Paroxysmal atrial fibrillation failing metoprolol:  - s/p afib ablation 11/11/2024   - in sinus rhythm, no reoccurrence of atrial fibrillation since ablation   - continue metoprolol succinate 100 mg    - 3 month ECHO and follow up scheduled  - Protonix, Carafate and colchicine completed     2. CVA protection:   - continue eliquis 5mg Q12H, no bleeding issues     3. HTN  - stable, continue amlodipine 10 mg, lisinopril 40 mg, metoprolol succinate 100 mg     Patient has been instructed and agrees to call our office with any issues or other concerns related to their cardiac condition(s) and/or complaint(s).      CLEMENTINE Patel    12/16/24  8:28 AM

## 2024-12-16 ENCOUNTER — OFFICE VISIT (OUTPATIENT)
Age: 64
End: 2024-12-16
Payer: COMMERCIAL

## 2024-12-16 VITALS
HEIGHT: 67 IN | DIASTOLIC BLOOD PRESSURE: 74 MMHG | HEART RATE: 51 BPM | WEIGHT: 265 LBS | BODY MASS INDEX: 41.59 KG/M2 | SYSTOLIC BLOOD PRESSURE: 138 MMHG

## 2024-12-16 DIAGNOSIS — I48.0 PAROXYSMAL ATRIAL FIBRILLATION (HCC): Primary | ICD-10-CM

## 2024-12-16 DIAGNOSIS — I10 PRIMARY HYPERTENSION: ICD-10-CM

## 2024-12-16 DIAGNOSIS — E78.2 MIXED HYPERLIPIDEMIA: ICD-10-CM

## 2024-12-16 PROCEDURE — 99214 OFFICE O/P EST MOD 30 MIN: CPT | Performed by: PHYSICIAN ASSISTANT

## 2024-12-16 PROCEDURE — 3075F SYST BP GE 130 - 139MM HG: CPT | Performed by: PHYSICIAN ASSISTANT

## 2024-12-16 PROCEDURE — 3078F DIAST BP <80 MM HG: CPT | Performed by: PHYSICIAN ASSISTANT

## 2024-12-16 PROCEDURE — 93000 ELECTROCARDIOGRAM COMPLETE: CPT | Performed by: PHYSICIAN ASSISTANT

## 2024-12-16 RX ORDER — MAGNESIUM 200 MG
200 TABLET ORAL DAILY
COMMUNITY

## 2025-02-25 ENCOUNTER — OFFICE VISIT (OUTPATIENT)
Dept: SLEEP MEDICINE | Age: 65
End: 2025-02-25
Payer: COMMERCIAL

## 2025-02-25 VITALS
DIASTOLIC BLOOD PRESSURE: 80 MMHG | RESPIRATION RATE: 19 BRPM | BODY MASS INDEX: 42.53 KG/M2 | HEART RATE: 68 BPM | SYSTOLIC BLOOD PRESSURE: 158 MMHG | HEIGHT: 67 IN | OXYGEN SATURATION: 93 % | WEIGHT: 271 LBS | TEMPERATURE: 97.2 F

## 2025-02-25 DIAGNOSIS — G47.34 NOCTURNAL HYPOXEMIA: ICD-10-CM

## 2025-02-25 DIAGNOSIS — G47.33 OSA (OBSTRUCTIVE SLEEP APNEA): Primary | ICD-10-CM

## 2025-02-25 DIAGNOSIS — E66.813 CLASS 3 SEVERE OBESITY DUE TO EXCESS CALORIES WITHOUT SERIOUS COMORBIDITY WITH BODY MASS INDEX (BMI) OF 40.0 TO 44.9 IN ADULT: ICD-10-CM

## 2025-02-25 DIAGNOSIS — E66.01 CLASS 3 SEVERE OBESITY DUE TO EXCESS CALORIES WITHOUT SERIOUS COMORBIDITY WITH BODY MASS INDEX (BMI) OF 40.0 TO 44.9 IN ADULT: ICD-10-CM

## 2025-02-25 PROCEDURE — 3079F DIAST BP 80-89 MM HG: CPT | Performed by: NURSE PRACTITIONER

## 2025-02-25 PROCEDURE — G2211 COMPLEX E/M VISIT ADD ON: HCPCS | Performed by: NURSE PRACTITIONER

## 2025-02-25 PROCEDURE — 3077F SYST BP >= 140 MM HG: CPT | Performed by: NURSE PRACTITIONER

## 2025-02-25 PROCEDURE — 99213 OFFICE O/P EST LOW 20 MIN: CPT | Performed by: NURSE PRACTITIONER

## 2025-02-25 ASSESSMENT — SLEEP AND FATIGUE QUESTIONNAIRES
HOW LIKELY ARE YOU TO NOD OFF OR FALL ASLEEP IN A CAR, WHILE STOPPED FOR A FEW MINUTES IN TRAFFIC: WOULD NEVER DOZE
ESS TOTAL SCORE: 6
HOW LIKELY ARE YOU TO NOD OFF OR FALL ASLEEP WHILE LYING DOWN TO REST IN THE AFTERNOON WHEN CIRCUMSTANCES PERMIT: MODERATE CHANCE OF DOZING
HOW LIKELY ARE YOU TO NOD OFF OR FALL ASLEEP WHILE SITTING AND READING: WOULD NEVER DOZE
HOW LIKELY ARE YOU TO NOD OFF OR FALL ASLEEP WHILE WATCHING TV: SLIGHT CHANCE OF DOZING
HOW LIKELY ARE YOU TO NOD OFF OR FALL ASLEEP WHEN YOU ARE A PASSENGER IN A CAR FOR AN HOUR WITHOUT A BREAK: HIGH CHANCE OF DOZING
HOW LIKELY ARE YOU TO NOD OFF OR FALL ASLEEP WHILE SITTING QUIETLY AFTER LUNCH WITHOUT ALCOHOL: WOULD NEVER DOZE
HOW LIKELY ARE YOU TO NOD OFF OR FALL ASLEEP WHILE SITTING AND TALKING TO SOMEONE: WOULD NEVER DOZE
HOW LIKELY ARE YOU TO NOD OFF OR FALL ASLEEP WHILE SITTING INACTIVE IN A PUBLIC PLACE: WOULD NEVER DOZE

## 2025-02-25 NOTE — PROGRESS NOTES
[facility]  [address]  [place] [address]  [phone]    Patient Name:  Jose Sandoval Jr.  YOB: 1960      Office Visit 2/25/2025    CHIEF COMPLAINT:    Chief Complaint   Patient presents with    Sleep Apnea    Follow-up         HISTORY OF PRESENT ILLNESS:  Patient is a 65 y.o. male seen today for follow up of MARA.  Diagnostic sleep study on 03/21/2024 with an AHI of 51.2 and lowest oxygen saturation of 72%. He is prescribed cpap therapy with a humidifier set at 7-12 cm with a full face mask. Most recent download reveals AHI on PAP therapy is 1.9, leak is median 0.2 and 5.5 at 95th percentile and the hourly usage is 8 hours 48 minutes nightly. The overall use is 2156 hours with days greater than four hours at 245/252. The patient is compliant with the Pap therapy and is feeling better as a result.    This is his first return visit since starting CPAP.  He reports that CPAP and really changed everything about his sleep.  States that he awakens in the mornings refreshed and denies any excessive daytime sleepiness or fatigue.  Weatherford score is 6/24.  He reports that he is sleeping more soundly through the night with less awakenings.  Denies any major medical changes since his last visit.  Reports that his weight has consistently been around 270 pounds.  His blood pressure is slightly elevated today at 158/80.  I did advise him to monitor his blood pressure at home and if it remains elevated to let his primary care provider know.      Weatherford Sleepiness Scale      2/25/2025     2:14 PM 5/29/2024    11:00 AM   Sleep Medicine   Sitting and reading 0 0   Watching TV 1 2   Sitting, inactive in a public place (e.g. a theatre or a meeting) 0 1   As a passenger in a car for an hour without a break 3 3   Lying down to rest in the afternoon when circumstances permit 2 3   Sitting and talking to someone 0 0   Sitting quietly after a lunch without alcohol 0 1   In a car,

## 2025-02-25 NOTE — PATIENT INSTRUCTIONS
Continue CPAP at new pressure settings off 7-12 cm H2O with nightly compliance  New CPAP supplies ordered  Recommendations as above  Follow-up in 1 year or sooner if needed

## 2025-02-26 ENCOUNTER — OFFICE VISIT (OUTPATIENT)
Age: 65
End: 2025-02-26
Payer: COMMERCIAL

## 2025-02-26 VITALS
SYSTOLIC BLOOD PRESSURE: 146 MMHG | HEART RATE: 56 BPM | WEIGHT: 270 LBS | HEIGHT: 67 IN | BODY MASS INDEX: 42.38 KG/M2 | DIASTOLIC BLOOD PRESSURE: 76 MMHG

## 2025-02-26 DIAGNOSIS — I48.0 PAROXYSMAL ATRIAL FIBRILLATION (HCC): Primary | ICD-10-CM

## 2025-02-26 PROCEDURE — 99214 OFFICE O/P EST MOD 30 MIN: CPT | Performed by: INTERNAL MEDICINE

## 2025-02-26 PROCEDURE — 3078F DIAST BP <80 MM HG: CPT | Performed by: INTERNAL MEDICINE

## 2025-02-26 PROCEDURE — 3077F SYST BP >= 140 MM HG: CPT | Performed by: INTERNAL MEDICINE

## 2025-02-26 PROCEDURE — 93000 ELECTROCARDIOGRAM COMPLETE: CPT | Performed by: INTERNAL MEDICINE

## 2025-02-26 RX ORDER — NEBIVOLOL 10 MG/1
10 TABLET ORAL DAILY
Qty: 90 TABLET | Refills: 1 | Status: SHIPPED | OUTPATIENT
Start: 2025-02-26

## 2025-02-26 NOTE — PROGRESS NOTES
[facility]  [address] [place] [address], [address]  [place] [address]  PHONE: [phone]  Jose Sandoval Jr.  1960    Chief Complant:    Chief Complaint   Patient presents with    Atrial Fibrillation     3 mos post PVI      Consultation is requested by [unfilled] for evaluation of Atrial Fibrillation (3 mos post PVI)    Reason for Consultation: pAF    History:  Jose Sandoval Jr. is a very pleasant 65 y.o. male with a past medical and cardiac history significant for HTN, HLD, obesity, GERD and paroxysmal atrial fibrillation and presents s/p afib ablation. He is doing well, no AF, no cardiac complaints.     Cardiac PMH: (Old records have been reviewed and summarized below)  TTE (3/15/24): EF 60-65%    Reviewed office note Dajuan SPRING 12/16/24    Past Medical History, Past Surgical History, Family history, Social History, and Medications were all reviewed with the patient today and updated as necessary.     Current Outpatient Medications   Medication Sig Dispense Refill    nebivolol (BYSTOLIC) 10 MG tablet Take 1 tablet by mouth daily 90 tablet 1    magnesium 200 MG TABS tablet Take 1 tablet by mouth daily      omeprazole (PRILOSEC) 40 MG delayed release capsule Take 1 capsule by mouth in the morning and at bedtime      amLODIPine (NORVASC) 10 MG tablet TAKE 1 TABLET BY MOUTH EVERY DAY 90 tablet 1    lisinopril (PRINIVIL;ZESTRIL) 40 MG tablet Take 1 tablet by mouth daily 90 tablet 1    atorvastatin (LIPITOR) 40 MG tablet Take 1 tablet by mouth daily 90 tablet 1    Cholecalciferol (VITAMIN D-3 PO) Take by mouth daily      KRILL OIL PO Take 500 mg by mouth daily      apixaban (ELIQUIS) 5 MG TABS tablet Take 1 tablet by mouth 2 times daily 180 tablet 3     No current facility-administered medications for this visit.     No Known Allergies  [unfilled]    Past Medical History:   Diagnosis Date    Atrial fibrillation (HCC)     Benign essential HTN 3/5/2015    Calculus of kidney

## 2025-03-21 SDOH — ECONOMIC STABILITY: FOOD INSECURITY: WITHIN THE PAST 12 MONTHS, THE FOOD YOU BOUGHT JUST DIDN'T LAST AND YOU DIDN'T HAVE MONEY TO GET MORE.: NEVER TRUE

## 2025-03-21 SDOH — ECONOMIC STABILITY: INCOME INSECURITY: IN THE LAST 12 MONTHS, WAS THERE A TIME WHEN YOU WERE NOT ABLE TO PAY THE MORTGAGE OR RENT ON TIME?: NO

## 2025-03-21 SDOH — ECONOMIC STABILITY: FOOD INSECURITY: WITHIN THE PAST 12 MONTHS, YOU WORRIED THAT YOUR FOOD WOULD RUN OUT BEFORE YOU GOT MONEY TO BUY MORE.: NEVER TRUE

## 2025-03-21 SDOH — ECONOMIC STABILITY: TRANSPORTATION INSECURITY
IN THE PAST 12 MONTHS, HAS THE LACK OF TRANSPORTATION KEPT YOU FROM MEDICAL APPOINTMENTS OR FROM GETTING MEDICATIONS?: NO

## 2025-03-21 ASSESSMENT — PATIENT HEALTH QUESTIONNAIRE - PHQ9
SUM OF ALL RESPONSES TO PHQ QUESTIONS 1-9: 0
1. LITTLE INTEREST OR PLEASURE IN DOING THINGS: NOT AT ALL
SUM OF ALL RESPONSES TO PHQ QUESTIONS 1-9: 0
1. LITTLE INTEREST OR PLEASURE IN DOING THINGS: NOT AT ALL
SUM OF ALL RESPONSES TO PHQ QUESTIONS 1-9: 0
2. FEELING DOWN, DEPRESSED OR HOPELESS: NOT AT ALL
SUM OF ALL RESPONSES TO PHQ QUESTIONS 1-9: 0
2. FEELING DOWN, DEPRESSED OR HOPELESS: NOT AT ALL
SUM OF ALL RESPONSES TO PHQ9 QUESTIONS 1 & 2: 0

## 2025-03-24 ENCOUNTER — OFFICE VISIT (OUTPATIENT)
Dept: INTERNAL MEDICINE CLINIC | Facility: CLINIC | Age: 65
End: 2025-03-24
Payer: COMMERCIAL

## 2025-03-24 VITALS
DIASTOLIC BLOOD PRESSURE: 80 MMHG | WEIGHT: 269 LBS | OXYGEN SATURATION: 98 % | HEART RATE: 54 BPM | BODY MASS INDEX: 42.22 KG/M2 | SYSTOLIC BLOOD PRESSURE: 140 MMHG | HEIGHT: 67 IN

## 2025-03-24 DIAGNOSIS — I48.0 PAROXYSMAL ATRIAL FIBRILLATION (HCC): ICD-10-CM

## 2025-03-24 DIAGNOSIS — I10 PRIMARY HYPERTENSION: ICD-10-CM

## 2025-03-24 DIAGNOSIS — E78.2 MIXED HYPERLIPIDEMIA: ICD-10-CM

## 2025-03-24 DIAGNOSIS — R73.01 IFG (IMPAIRED FASTING GLUCOSE): Primary | ICD-10-CM

## 2025-03-24 DIAGNOSIS — Z12.5 PROSTATE CANCER SCREENING: ICD-10-CM

## 2025-03-24 LAB
ALBUMIN SERPL-MCNC: 3.9 G/DL (ref 3.2–4.6)
ALBUMIN/GLOB SERPL: 1.2 (ref 1–1.9)
ALP SERPL-CCNC: 82 U/L (ref 40–129)
ALT SERPL-CCNC: 25 U/L (ref 8–55)
ANION GAP SERPL CALC-SCNC: 13 MMOL/L (ref 7–16)
AST SERPL-CCNC: 22 U/L (ref 15–37)
BILIRUB SERPL-MCNC: 0.5 MG/DL (ref 0–1.2)
BUN SERPL-MCNC: 12 MG/DL (ref 8–23)
CALCIUM SERPL-MCNC: 9.5 MG/DL (ref 8.8–10.2)
CHLORIDE SERPL-SCNC: 103 MMOL/L (ref 98–107)
CHOLEST SERPL-MCNC: 148 MG/DL (ref 0–200)
CO2 SERPL-SCNC: 24 MMOL/L (ref 20–29)
CREAT SERPL-MCNC: 0.85 MG/DL (ref 0.8–1.3)
ERYTHROCYTE [DISTWIDTH] IN BLOOD BY AUTOMATED COUNT: 13 % (ref 11.9–14.6)
EST. AVERAGE GLUCOSE BLD GHB EST-MCNC: 133 MG/DL
GLOBULIN SER CALC-MCNC: 3.3 G/DL (ref 2.3–3.5)
GLUCOSE SERPL-MCNC: 85 MG/DL (ref 70–99)
HBA1C MFR BLD: 6.3 % (ref 0–5.6)
HCT VFR BLD AUTO: 45.1 % (ref 41.1–50.3)
HDLC SERPL-MCNC: 40 MG/DL (ref 40–60)
HDLC SERPL: 3.7 (ref 0–5)
HGB BLD-MCNC: 14.8 G/DL (ref 13.6–17.2)
LDLC SERPL CALC-MCNC: 83 MG/DL (ref 0–100)
MCH RBC QN AUTO: 29.3 PG (ref 26.1–32.9)
MCHC RBC AUTO-ENTMCNC: 32.8 G/DL (ref 31.4–35)
MCV RBC AUTO: 89.3 FL (ref 82–102)
NRBC # BLD: 0 K/UL (ref 0–0.2)
PLATELET # BLD AUTO: 190 K/UL (ref 150–450)
PMV BLD AUTO: 11.9 FL (ref 9.4–12.3)
POTASSIUM SERPL-SCNC: 4.1 MMOL/L (ref 3.5–5.1)
PROT SERPL-MCNC: 7.1 G/DL (ref 6.3–8.2)
PSA SERPL-MCNC: 1.1 NG/ML (ref 0–4)
RBC # BLD AUTO: 5.05 M/UL (ref 4.23–5.6)
SODIUM SERPL-SCNC: 140 MMOL/L (ref 136–145)
TRIGL SERPL-MCNC: 125 MG/DL (ref 0–150)
TSH W FREE THYROID IF ABNORMAL: 1.33 UIU/ML (ref 0.27–4.2)
VLDLC SERPL CALC-MCNC: 25 MG/DL (ref 6–23)
WBC # BLD AUTO: 7.5 K/UL (ref 4.3–11.1)

## 2025-03-24 PROCEDURE — 3077F SYST BP >= 140 MM HG: CPT | Performed by: FAMILY MEDICINE

## 2025-03-24 PROCEDURE — 3079F DIAST BP 80-89 MM HG: CPT | Performed by: FAMILY MEDICINE

## 2025-03-24 PROCEDURE — 99214 OFFICE O/P EST MOD 30 MIN: CPT | Performed by: FAMILY MEDICINE

## 2025-03-24 NOTE — PROGRESS NOTES
Rebel Joseph DO  Diplomate of the American Board of Family Medicine  [facility]    Jose Sandoval Jr. (: 1960) is a 65 y.o. male, here for evaluation of the following chief complaint(s):  Annual Exam     1. IFG (impaired fasting glucose)  -     Hemoglobin A1C; Future  2. Paroxysmal atrial fibrillation (HCC)  -     Comprehensive Metabolic Panel; Future  -     TSH reflex to FT4; Future  3. Primary hypertension  4. Mixed hyperlipidemia  -     Lipid Panel; Future  -     CBC; Future  5. Prostate cancer screening  -     PSA Screening; Future    -Currently in NSR with rate controlled.  Continued follow up with Cardiology and will continue with nebivolol.  -Will recheck A1c, continue to periodically monitor.  Encourage healthy eating/exercise.  -Tolerating medication well for HTN. Achieving desired therapeutic response with   Hypertension Medications       ACE Inhibitors       lisinopril (PRINIVIL;ZESTRIL) 40 MG tablet Take 1 tablet by mouth daily       Calcium Channel Blockers       amLODIPine (NORVASC) 10 MG tablet TAKE 1 TABLET BY MOUTH EVERY DAY       Beta Blockers Cardio-Selective       nebivolol (BYSTOLIC) 10 MG tablet Take 1 tablet by mouth daily         --will continue. Will periodically review and adjust if needed.  Encourage home monitoring.   -Recheck lipids and encourage healthy eating/exercise as able.  -Pros and cons of PSA testing for prostate screening were discussed.  The patient does wish to proceed.    History of Present Illness  Blood pressure has decreased since resuming Bystolic 1 month ago, with readings in the 140s/70s.   No atrial fibrillation symptoms noted. Ablation performed on 2024. Occasional nocturnal heart rate elevations (100-150 bpm) attributed to dreams, lasting <1 minute, detected by Fitbit.   Followed by Cardiology.  HTN: Home BP Monitoring: yes. taking medications as instructed, no medication side effects noted.  He denies chest pain,

## 2025-03-25 ENCOUNTER — RESULTS FOLLOW-UP (OUTPATIENT)
Dept: INTERNAL MEDICINE CLINIC | Facility: CLINIC | Age: 65
End: 2025-03-25

## 2025-03-25 DIAGNOSIS — R73.01 IFG (IMPAIRED FASTING GLUCOSE): Primary | ICD-10-CM

## 2025-03-26 ENCOUNTER — TELEPHONE (OUTPATIENT)
Dept: INTERNAL MEDICINE CLINIC | Facility: CLINIC | Age: 65
End: 2025-03-26

## 2025-03-26 NOTE — TELEPHONE ENCOUNTER
You sent in Jardiance for patient but insurance requires him to try Metformin first before coverage.     Please advise.

## 2025-03-27 RX ORDER — METFORMIN HYDROCHLORIDE 500 MG/1
500 TABLET, EXTENDED RELEASE ORAL
Qty: 90 TABLET | Refills: 1 | Status: SHIPPED | OUTPATIENT
Start: 2025-03-27

## 2025-05-07 DIAGNOSIS — I10 ESSENTIAL (PRIMARY) HYPERTENSION: ICD-10-CM

## 2025-05-08 RX ORDER — OMEPRAZOLE 40 MG/1
CAPSULE, DELAYED RELEASE ORAL DAILY
Qty: 90 CAPSULE | Refills: 3 | Status: SHIPPED | OUTPATIENT
Start: 2025-05-08

## 2025-05-08 RX ORDER — LISINOPRIL 40 MG/1
40 TABLET ORAL DAILY
Qty: 90 TABLET | Refills: 3 | Status: SHIPPED | OUTPATIENT
Start: 2025-05-08

## 2025-05-17 DIAGNOSIS — E78.2 MIXED HYPERLIPIDEMIA: ICD-10-CM

## 2025-05-19 RX ORDER — ATORVASTATIN CALCIUM 40 MG/1
40 TABLET, FILM COATED ORAL DAILY
Qty: 90 TABLET | Refills: 3 | Status: SHIPPED | OUTPATIENT
Start: 2025-05-19

## 2025-06-11 DIAGNOSIS — I10 ESSENTIAL (PRIMARY) HYPERTENSION: ICD-10-CM

## 2025-06-12 RX ORDER — AMLODIPINE BESYLATE 10 MG/1
10 TABLET ORAL DAILY
Qty: 90 TABLET | Refills: 3 | Status: SHIPPED | OUTPATIENT
Start: 2025-06-12

## 2025-07-02 ENCOUNTER — LAB (OUTPATIENT)
Dept: INTERNAL MEDICINE CLINIC | Facility: CLINIC | Age: 65
End: 2025-07-02

## 2025-07-02 DIAGNOSIS — R73.01 IFG (IMPAIRED FASTING GLUCOSE): ICD-10-CM

## 2025-07-02 DIAGNOSIS — R73.01 IFG (IMPAIRED FASTING GLUCOSE): Primary | ICD-10-CM

## 2025-07-02 LAB
EST. AVERAGE GLUCOSE BLD GHB EST-MCNC: 134 MG/DL
HBA1C MFR BLD: 6.3 % (ref 0–5.6)

## 2025-07-04 ENCOUNTER — RESULTS FOLLOW-UP (OUTPATIENT)
Dept: INTERNAL MEDICINE CLINIC | Facility: CLINIC | Age: 65
End: 2025-07-04

## 2025-07-29 ENCOUNTER — TELEPHONE (OUTPATIENT)
Age: 65
End: 2025-07-29

## 2025-07-29 NOTE — TELEPHONE ENCOUNTER
Jose Sandoval Jr. \"Fahad\" to [facility] Cardiology Clinical Staff (supporting Neo Knox DO)        7/29/25  8:09 AM  I just went out of afib, so it lasted 7 hours.

## 2025-07-29 NOTE — TELEPHONE ENCOUNTER
Patient notified of MD response. Scheduled to come in on 7/31/25 at 8:15 am to see Angelita SPRING. Patient voices understanding.

## 2025-07-29 NOTE — TELEPHONE ENCOUNTER
roman Sandoval Jr. \"Fahad\" to [facility] Cardiology Clinical Staff (supporting Neo Knox DO)        7/29/25  6:58 AM  I underwent ablation back in November, and this morning around 1AM I went back into Afib for the first time since the ablation...I have been in afib for about 6 hours now, and my heartrate is hovering around 130. Should I be concerned?

## 2025-07-29 NOTE — TELEPHONE ENCOUNTER
Patient reports he has ablation for Afib in November 2024. Went back into Afib for first time since ablation around 1 am- Hr maintained around 125-135. Afib lasted 7 hours. Currently back in SR with HR 65. Patient is no longer on blood thinners.   Current medications:  Amlodipine 10 mg daily  Lisinopril 40 mg daily  Bystolic 10 mg daily  Will address with MD

## 2025-07-30 NOTE — PROGRESS NOTES
[address], [address]  [place] [address]  PHONE: [phone]  Jose Sandoval Jr.  1960    Chief Complant:    Chief Complaint   Patient presents with    Atrial Fibrillation      History:  Jose Sandoval Jr. is a very pleasant 65 y.o. male with a past medical and cardiac history significant for HTN, HLD, obesity, GERD and paroxysmal atrial fibrillation and presents for EP consultation for pAF. He was diagnosed with afib earlier this year. He reports episodes very few weeks, lasting 4-5 day, associated with anxiety, palpitations, rapid irregular HR. Denies chest pain, syncope, SOB.He underwent afib ablation on 11/11/2024. He called in with 7 hour episode of atrial fibrillation. Patient had palpitations and elevated heart rate.  Denies any chest pain or shortness of breath. This is the first episode since ablation. Feels he may have been dehydrated. Camped over the weekend. No exertional symptoms.     Cardiac PMH: (Old records have been reviewed and summarized below)  Monitor (2/29/2024): Predominant underlying rhythm was Atrial Fibrillation with an average HR of 87 bpm (range  bpm). 7 Supraventricular Tachycardia runs occurred, the longest lasting 6 beats and the fastest at 160 bpm. 56% Atrial Fibrillation burden, the longest episode lasting 4 days 0 hours with an avg rate of 108 bpm. 3 Pauses occurred, the longest lasting 3.2 secs (19 bpm). Rare APCs/VPCs. Patient-triggered events corresponded with atrial fibrillation or ectopic beats.     TTE (3/15/24): EF 60-65%    ECHO (10/22/2024): EF 57%    Ablation (11/11/2024):   1.Successful pulmonary vein isolation x4.  2.Creation of a line of bidirectional block at the cavotricuspid isthmus.  3. Comprehensive EP study.    TTE (2/26/25): EF 62%, mild to moderate TR    Reviewed office note Dr Garcia 2/26/25    Past Medical History, Past Surgical History, Family history, Social History, and Medications were all

## 2025-07-31 ENCOUNTER — OFFICE VISIT (OUTPATIENT)
Age: 65
End: 2025-07-31
Payer: COMMERCIAL

## 2025-07-31 VITALS
HEART RATE: 51 BPM | WEIGHT: 269 LBS | HEIGHT: 67 IN | SYSTOLIC BLOOD PRESSURE: 140 MMHG | DIASTOLIC BLOOD PRESSURE: 60 MMHG | BODY MASS INDEX: 42.22 KG/M2

## 2025-07-31 DIAGNOSIS — I48.0 PAROXYSMAL ATRIAL FIBRILLATION (HCC): Primary | ICD-10-CM

## 2025-07-31 PROCEDURE — 3077F SYST BP >= 140 MM HG: CPT | Performed by: PHYSICIAN ASSISTANT

## 2025-07-31 PROCEDURE — 99214 OFFICE O/P EST MOD 30 MIN: CPT | Performed by: PHYSICIAN ASSISTANT

## 2025-07-31 PROCEDURE — 3078F DIAST BP <80 MM HG: CPT | Performed by: PHYSICIAN ASSISTANT

## 2025-07-31 PROCEDURE — 93000 ELECTROCARDIOGRAM COMPLETE: CPT | Performed by: PHYSICIAN ASSISTANT

## 2025-08-05 ENCOUNTER — PATIENT MESSAGE (OUTPATIENT)
Age: 65
End: 2025-08-05

## 2025-08-05 ENCOUNTER — TELEPHONE (OUTPATIENT)
Age: 65
End: 2025-08-05

## 2025-08-12 ENCOUNTER — TELEPHONE (OUTPATIENT)
Age: 65
End: 2025-08-12

## 2025-08-12 ENCOUNTER — RESULTS FOLLOW-UP (OUTPATIENT)
Age: 65
End: 2025-08-12

## 2025-08-25 RX ORDER — NEBIVOLOL 10 MG/1
10 TABLET ORAL DAILY
Qty: 90 TABLET | Refills: 3 | Status: SHIPPED | OUTPATIENT
Start: 2025-08-25

## (undated) DEVICE — PATCH REF EXT FOR CARTO 3 SYS (EA = 6 PACKS)

## (undated) DEVICE — CATHETER US GE COMPATIBILITY SOUNDSTAR ECO 10FR

## (undated) DEVICE — PINNACLE INTRODUCER SHEATH: Brand: PINNACLE

## (undated) DEVICE — STERILE (15.2 TAPERED TO 7.6 X 183CM) POLYETHYLENE ACCORDION-FOLDED COVER FOR USE WITH SIEMENS ACUNAV ULTRASOUND CATHETER FAMILY CONNECTOR: Brand: SWIFTLINK TRANSDUCER COVER

## (undated) DEVICE — SHEATH INTRO 50 DEG 0.038 INX180 CM 8.5 FRX63 CM HEARTSPAN

## (undated) DEVICE — CATHETER EP 7FR L115CM 2-8-2MM SPC TIP 2MM 10 ELECTRD F L

## (undated) DEVICE — CATHETER MAP F CRV 2-2-2-2-2 MM SPC PERSEID OCTARAY

## (undated) DEVICE — PINNACLE TIF INTRODUCER SHEATH: Brand: PINNACLE

## (undated) DEVICE — PRESSURE MONITORING SET: Brand: TRUWAVE

## (undated) DEVICE — CATHETER ABLAT 8FR L115CM 1-6-2MM SPC TIP 3.5MM FJ CRV

## (undated) DEVICE — CATHETER REPROC EP F-J BIOBD710FJ282CTR

## (undated) DEVICE — SYSTEM CLOSURE 6-12 FR VEN VASC VASCADE MVP

## (undated) DEVICE — Device: Brand: NRG TRANSSEPTAL NEEDLE

## (undated) DEVICE — 18G NG KIT WITH 96IN PROBE COVER (10 PK): Brand: SITE-RITE

## (undated) DEVICE — TUBING PMP FOR CARTO SYS SMARTABLATE